# Patient Record
Sex: FEMALE | Race: WHITE | NOT HISPANIC OR LATINO | Employment: UNEMPLOYED | URBAN - METROPOLITAN AREA
[De-identification: names, ages, dates, MRNs, and addresses within clinical notes are randomized per-mention and may not be internally consistent; named-entity substitution may affect disease eponyms.]

---

## 2017-06-29 ENCOUNTER — TELEPHONE (OUTPATIENT)
Dept: OBGYN | Facility: CLINIC | Age: 27
End: 2017-06-29

## 2017-06-29 ENCOUNTER — PRENATAL OFFICE VISIT (OUTPATIENT)
Dept: OBGYN | Facility: CLINIC | Age: 27
End: 2017-06-29
Payer: COMMERCIAL

## 2017-06-29 VITALS
DIASTOLIC BLOOD PRESSURE: 78 MMHG | SYSTOLIC BLOOD PRESSURE: 121 MMHG | WEIGHT: 181.8 LBS | HEART RATE: 80 BPM | HEIGHT: 61 IN | BODY MASS INDEX: 34.32 KG/M2

## 2017-06-29 DIAGNOSIS — Z34.01 ENCOUNTER FOR SUPERVISION OF NORMAL FIRST PREGNANCY IN FIRST TRIMESTER: ICD-10-CM

## 2017-06-29 DIAGNOSIS — O02.1 MISSED ABORTION: Primary | ICD-10-CM

## 2017-06-29 LAB
ABO + RH BLD: NORMAL
ABO + RH BLD: NORMAL
ALBUMIN UR-MCNC: NEGATIVE MG/DL
APPEARANCE UR: CLEAR
B-HCG SERPL-ACNC: 8976 IU/L (ref 0–5)
BILIRUB UR QL STRIP: NEGATIVE
COLOR UR AUTO: YELLOW
GLUCOSE UR STRIP-MCNC: NEGATIVE MG/DL
HGB UR QL STRIP: ABNORMAL
KETONES UR STRIP-MCNC: NEGATIVE MG/DL
LEUKOCYTE ESTERASE UR QL STRIP: NEGATIVE
NITRATE UR QL: NEGATIVE
NON-SQ EPI CELLS #/AREA URNS LPF: ABNORMAL /LPF
PH UR STRIP: 5.5 PH (ref 5–7)
RBC #/AREA URNS AUTO: ABNORMAL /HPF (ref 0–2)
SP GR UR STRIP: <=1.005 (ref 1–1.03)
SPECIMEN EXP DATE BLD: NORMAL
URN SPEC COLLECT METH UR: ABNORMAL
UROBILINOGEN UR STRIP-ACNC: 0.2 EU/DL (ref 0.2–1)
WBC #/AREA URNS AUTO: ABNORMAL /HPF (ref 0–2)

## 2017-06-29 PROCEDURE — 87086 URINE CULTURE/COLONY COUNT: CPT | Performed by: OBSTETRICS & GYNECOLOGY

## 2017-06-29 PROCEDURE — 86901 BLOOD TYPING SEROLOGIC RH(D): CPT | Performed by: OBSTETRICS & GYNECOLOGY

## 2017-06-29 PROCEDURE — 84702 CHORIONIC GONADOTROPIN TEST: CPT | Performed by: OBSTETRICS & GYNECOLOGY

## 2017-06-29 PROCEDURE — 36415 COLL VENOUS BLD VENIPUNCTURE: CPT | Performed by: OBSTETRICS & GYNECOLOGY

## 2017-06-29 PROCEDURE — 86900 BLOOD TYPING SEROLOGIC ABO: CPT | Performed by: OBSTETRICS & GYNECOLOGY

## 2017-06-29 PROCEDURE — 99214 OFFICE O/P EST MOD 30 MIN: CPT | Performed by: OBSTETRICS & GYNECOLOGY

## 2017-06-29 PROCEDURE — 99207 ZZC FIRST OB VISIT: CPT | Performed by: OBSTETRICS & GYNECOLOGY

## 2017-06-29 PROCEDURE — 81001 URINALYSIS AUTO W/SCOPE: CPT | Performed by: OBSTETRICS & GYNECOLOGY

## 2017-06-29 ASSESSMENT — ANXIETY QUESTIONNAIRES
IF YOU CHECKED OFF ANY PROBLEMS ON THIS QUESTIONNAIRE, HOW DIFFICULT HAVE THESE PROBLEMS MADE IT FOR YOU TO DO YOUR WORK, TAKE CARE OF THINGS AT HOME, OR GET ALONG WITH OTHER PEOPLE: NOT DIFFICULT AT ALL
6. BECOMING EASILY ANNOYED OR IRRITABLE: NOT AT ALL
2. NOT BEING ABLE TO STOP OR CONTROL WORRYING: NOT AT ALL
7. FEELING AFRAID AS IF SOMETHING AWFUL MIGHT HAPPEN: NOT AT ALL
GAD7 TOTAL SCORE: 0
3. WORRYING TOO MUCH ABOUT DIFFERENT THINGS: NOT AT ALL
5. BEING SO RESTLESS THAT IT IS HARD TO SIT STILL: NOT AT ALL
1. FEELING NERVOUS, ANXIOUS, OR ON EDGE: NOT AT ALL

## 2017-06-29 ASSESSMENT — PATIENT HEALTH QUESTIONNAIRE - PHQ9: 5. POOR APPETITE OR OVEREATING: NOT AT ALL

## 2017-06-29 NOTE — TELEPHONE ENCOUNTER
Reason for Call:  Request for results:    Name of test or procedure: patient saw MI for her 1st pre-millie visit. She wants to talk about the lab resuts    Date of test of procedure: today    Location of the test or procedure: lab    OK to leave the result message on voice mail or with a family member? YES    Phone number Patient can be reached at:  Cell number on file:    Telephone Information:   Mobile [phone]       Additional comments: none    Call taken on 2017 at 3:35 PM by Norma Singletary

## 2017-06-29 NOTE — PROGRESS NOTES
This is a 26 year old female patient,   who presents for her first obstetrical visit.    Patient's last menstrual period was 2017..  This gives her an EDC of 2018 .  She is 8w2d weeks.  Her cycles are regular.  Her last menstrual period was normal.  She has not had an ultrasound prior to the visit..  Since her LMP, she has experienced  abdominal pain and fatigue).  She denies nausea, emesis, headache, loss of appetite, vaginal discharge, dysuria, pelvic pain, urinary urgency, lightheadedness, urinary frequency, vaginal bleeding, hemorrhoids and constipation.    No bleeding or problems. She is sure of her dates.       Past History:  Her past medical history   Past Medical History:   Diagnosis Date     Cold sore      Need for prophylactic vaccination and inoculation against other viral diseases(V04.89) (aka GARDASIL)     Completed series in      OCP (oral contraceptive pills) initiation    .      This is her first pregnancy    Since her last LMP she denies use of alcohol, tobacco and street drugs.    HISTORY:  Obstetric History       T0      L0     SAB0   TAB0   Ectopic0   Multiple0   Live Births0       # Outcome Date GA Lbr Damián/2nd Weight Sex Delivery Anes PTL Lv   1 Current                 Past Medical History:   Diagnosis Date     Cold sore      Need for prophylactic vaccination and inoculation against other viral diseases(V04.89) (aka GARDASIL)     Completed series in      OCP (oral contraceptive pills) initiation      Past Surgical History:   Procedure Laterality Date     C REPAIR CRUCIATE LIGAMENT,KNEE       HC CREATE EARDRUM OPENING,GEN ANESTH      P.E. Tubes     HC REMOVAL ADENOIDS,PRIMARY,<13 Y/O  2001     LUMPECTOMY BREAST Left      Family History   Problem Relation Age of Onset     Breast Cancer Maternal Grandmother      DIABETES Maternal Grandfather      HEART DISEASE Maternal Grandfather 57     triple bypass     Hyperlipidemia Maternal Grandfather       "Hypertension Maternal Grandfather      Social History     Social History     Marital status: Single     Spouse name: N/A     Number of children: 0     Years of education: N/A     Occupational History      Student     Social History Main Topics     Smoking status: Never Smoker     Smokeless tobacco: Never Used      Comment: No smokers in home.     Alcohol use No     Drug use: No     Sexual activity: Yes     Partners: Male     Birth control/ protection: OCP      Comment: LMP 09/12/15     Other Topics Concern     None     Social History Narrative    No advanced care directives on file    Student    Single         Current Outpatient Prescriptions   Medication Sig     norethindrone-ethinyl estradiol (JUNEL 1.5/30) 1.5-30 MG-MCG per tablet TAKE 1 TABLET BY MOUTH EVERYDAY CONTINUOSLY. (Patient not taking: Reported on 6/29/2017)     valACYclovir (VALTREX) 1000 mg tablet Take 1 tablet (1,000 mg) by mouth daily (Patient not taking: Reported on 6/29/2017)     TRAMADOL HCL 50 MG OR TABS ONE TO TWO TABLETS EVERY 4 TO 6 HOURS AS NEEDED FOR PAIN (Patient not taking: No sig reported)     No current facility-administered medications for this visit.      Allergies   Allergen Reactions     No Known Drug Allergies        Past medical, surgical, social and family history were reviewed and updated in [facility].    ROS:   Constitutional: Fatigue  Gastrointestinal: Abdominal Pain    EXAM:  /78 (BP Location: Right arm, Patient Position: Sitting, Cuff Size: Adult Regular)  Pulse 80  Ht 5' 1\" (1.549 m)  Wt 181 lb 12.8 oz (82.5 kg)  LMP 05/02/2017  BMI 34.35 kg/m2   BMI: Body mass index is 34.35 kg/(m^2).    EXAM:  Constitutional:  Appearance: Well nourished, well developed alert, in no acute distress.  Neck:   Lymph Nodes:  No lymphadenopathy present.    Thyroid:  Gland size normal, nontender, no nodules or masses present  on palpation.  Chest:  Respiratory Effort:  Breathing unlabored.  Cardiovascular:  Heart Auscultation:  Regular " rate, normal rhythm, no murmurs    present.  Breasts: Deferred.    Axillary Lymph Nodes:  No lymphadenopathy present.  Gastrointestinal:  Abdominal Examination:  Abdomen nontender to palpation, tone  normal without rigidity or guarding, no masses present, umbilicus without  Lesions.    Liver and speen:  No hepatomegaly present, liver nontender to  palpation.    Hernias:  No hernias present.  Lymphatic: Lymph Nodes:  No other lymphadenopathy present.  Skin:  General Inspection:  No rashes present, no lesions present, no areas of  discoloration.    Genitalia and Groin:  No rashes present, no lesions present, no areas of  discoloration, no masses present.  Neurologic/Psychiatric:    Mental Status:  Oriented X3.    Pelvic Exam:  External Genitalia:     Normal appearance for age, no discharge present, no tenderness present, no inflammatory lesions present, color normal  Vagina:     Normal vaginal vault without central or paravaginal defects, no discharge present, no inflammatory lesions present, no masses present  Bladder:     Nontender to palpation  Urethra:   Urethral Body:  Urethra palpation normal, urethra structural support normal   Urethral Meatus:  No erythema or lesions present  Cervix:     Appearance healthy, no lesions present, nontender to palpation, no bleeding present  Uterus:     Uterus: firm, normal sized and nontender, retroverted in position.   Adnexa:     No adnexal tenderness present, no adnexal masses present  Perineum:     Perineum within normal limits, no evidence of trauma, no rashes or skin lesions present  Anus:     Anus within normal limits, no hemorrhoids present  Inguinal Lymph Nodes:     No lymphadenopathy present  Pubic Hair:     Normal pubic hair distribution for age  Genitalia and Groin:     No rashes present, no lesions present, no areas of discoloration, no masses present    Bedside u/s: Intrauterine gestational sac with yolk sac. No fetal pole    ASSESSMENT:      ICD-10-CM    1. Missed   O02.1 HCG quantitative pregnancy     HCG quantitative pregnancy     ABO and Rh   2. Encounter for supervision of normal first pregnancy in first trimester Z34.01 Urine Culture Aerobic Bacterial     UA with Microscopic       PLAN/PATIENT INSTRUCTIONS:    Since pt is sure of LMP, Missed AB is likely. Will have HCG today and in 48 hours. Discussed reasons for AB and rates. Discussed observation, medication or D/C. Discussed chance for recurrence and early u/s.      Rafael Carrasco MD

## 2017-06-29 NOTE — MR AVS SNAPSHOT
"              After Visit Summary   2017    Guerita SILVA    MRN: [id number]           Patient Information     Date Of Birth          1990        Visit Information        Provider Department      2017 9:00 AM Rafael Carrasco MD; WE TRIAGE [facility]        Today's Diagnoses     Missed     -  1    Encounter for supervision of normal first pregnancy in first trimester           Follow-ups after your visit        Your next 10 appointments already scheduled     2017  4:30 PM CDT   Classes with WE CLASS   [facility] ([facility] [place])    [address] [place] [id number]   [phone]              Future tests that were ordered for you today     Open Future Orders        Priority Expected Expires Ordered    HCG quantitative pregnancy STAT 2017            Who to contact     If you have questions or need follow up information about today's clinic visit or your schedule please contact [facility] directly at [phone].  Normal or non-critical lab and imaging results will be communicated to you by Eathart, letter or phone within 4 business days after the clinic has received the results. If you do not hear from us within 7 days, please contact the clinic through Chirpmet or phone. If you have a critical or abnormal lab result, we will notify you by phone as soon as possible.  Submit refill requests through [employer] or call your pharmacy and they will forward the refill request to us. Please allow 3 business days for your refill to be completed.          Additional Information About Your Visit        Eathart Information     [employer] lets you send messages to your doctor, view your test results, renew your prescriptions, schedule appointments and more. To sign up, go to [email or web address] . Click on \"Log in\" on the left side of the screen, which will take you to " "the Welcome page. Then click on \"Sign up Now\" on the right side of the page.     You will be asked to enter the access code listed below, as well as some personal information. Please follow the directions to create your username and password.     Your access code is: [email or web address]  Expires: 2017 10:01 AM     Your access code will  in 90 days. If you need help or a new code, please call your [facility] clinic or [phone].        Care EveryWhere ID     This is your Care EveryWhere ID. This could be used by other organizations to access your [facility] medical records  [id number]        Your Vitals Were     Pulse Height Last Period BMI (Body Mass Index)          80 5' 1\" (1.549 m) 2017 34.35 kg/m2         Blood Pressure from Last 3 Encounters:   17 121/78   10/20/16 126/84   09/18/15 112/76    Weight from Last 3 Encounters:   17 181 lb 12.8 oz (82.5 kg)   10/20/16 180 lb 12.8 oz (82 kg)   09/18/15 168 lb (76.2 kg)              We Performed the Following     ABO and Rh     HCG quantitative pregnancy     UA with Microscopic     Urine Culture Aerobic Bacterial        Primary Care Provider Office Phone # Fax #    Marcio Alan Harper [phone]       [facility] [address] [facility] [address]        Equal Access to Services     [facility] AH: Hadii aad ku hadasho Sochelita, waaxda luqadaha, qaybta kaalmada adethuyada, alina hylton . So [facility] [phone].    ATENCIÓN: Si habla español, tiene a tang disposición servicios gratuitos de asistencia lingüística. Llame al [phone].    We comply with applicable federal civil rights laws and [place] laws. We do not discriminate on the basis of race, color, national origin, age, disability sex, sexual orientation or gender identity.            Thank you!     Thank you for choosing [facility]  for your care. Our goal is always to provide you with excellent care. " Hearing back from our patients is one way we can continue to improve our services. Please take a few minutes to complete the written survey that you may receive in the mail after your visit with us. Thank you!             Your Updated Medication List - Protect others around you: Learn how to safely use, store and throw away your medicines at www.disposemymeds.org.          This list is accurate as of: 6/29/17 10:02 AM.  Always use your most recent med list.                   Brand Name Dispense Instructions for use Diagnosis    norethindrone-ethinyl estradiol 1.5-30 MG-MCG per tablet    JUNEL 1.5/30    112 tablet    TAKE 1 TABLET BY MOUTH EVERYDAY CONTINUOSLY.    Uses oral contraception       traMADol 50 MG tablet    ULTRAM    28    ONE TO TWO TABLETS EVERY 4 TO 6 HOURS AS NEEDED FOR PAIN    Migraine headaches       valACYclovir 1000 mg tablet    VALTREX    90 tablet    Take 1 tablet (1,000 mg) by mouth daily    Recurrent cold sores

## 2017-06-29 NOTE — NURSING NOTE
Obstetrical Risk History    Patient presents for new OB labs and teaching.      1. Please indicate any condition you have or have had in the past:  Cold Sores  2. Do you smoke?  No  If yes, how many packs/day?   3. Do you drink alcoholic beverages? No  If yes, how often?  What type?   4. List any medications taken since your last period: None  5. List any recreational drugs (cocaine, marijuana, etc. used since your last period:None    6. List any chemical or radiation exposure that you've encountered: None  7. Are you on a restricted diet? No  If yes, please describe:  Do you have any Spiritism objections to any form of treatment? No    GENETIC SCREENING    These questions apply to you, the baby's father, or anyone in either family with:    1. Patient's age 35 or greater at delivery? No  2. Chadian, Puerto Rican, Mediterranean ancestry? No  3. Neural Tube Defect (Meningomyelocele, Spina Bifida, or Anencephaly)? No  4. Voodoo, Kyr[place]z [place] or history of Berlin-Sachs disease? No  5. Down's Syndrome?   No  6. Hemophilia or clotting disorder? No  7. Muscular Dystrophy? No  8. Cystic Fibrosis? No  9. [place]'s Chorea? No  10. Mental Retardation? No  11. 3 or more miscarriages or a stillborn? No  12. Other inherited disease or chromosomal disorder? No  13. Have you or the baby's father had a child born with a birth defect? No  14. Did you or the baby's father have a birth defect yourselves? No    Do you have any other concerns about birth defects? No    Last Pap 9/18/15- normal  Has not had flu shot. History of cold sores takes valACYclovir (VALTREX) 1000 mg tablet

## 2017-06-30 LAB
BACTERIA SPEC CULT: NO GROWTH
Lab: NORMAL
MICRO REPORT STATUS: NORMAL
SPECIMEN SOURCE: NORMAL

## 2017-06-30 ASSESSMENT — ANXIETY QUESTIONNAIRES: GAD7 TOTAL SCORE: 0

## 2017-06-30 ASSESSMENT — PATIENT HEALTH QUESTIONNAIRE - PHQ9: SUM OF ALL RESPONSES TO PHQ QUESTIONS 1-9: 0

## 2017-06-30 NOTE — TELEPHONE ENCOUNTER
Pt called back and was just on the phone with [facility] lab where she is going to go tomorrow (7/1/17) to get her HCG quant lab drawn. Pt is going to go around 10:30 AM. That was on the questions that had. Pt also had the question that her HCG value was so high, is that ok? Informed that the high value shows how far along she was and Dr. Carrasco is aware of her number. The main thing is that we want to make sure it's dropping appropriately, not too slow, and also not rising.   Pt verbalized understanding of information. Lab order has already been ordered/futured out by Dr. Carrasco.      Closing encounter.

## 2017-07-01 ENCOUNTER — HOSPITAL ENCOUNTER (OUTPATIENT)
Dept: LAB | Facility: CLINIC | Age: 27
Discharge: HOME | End: 2017-07-01
Attending: OBSTETRICS & GYNECOLOGY | Admitting: OBSTETRICS & GYNECOLOGY
Payer: COMMERCIAL

## 2017-07-01 DIAGNOSIS — O02.1 MISSED ABORTION: ICD-10-CM

## 2017-07-01 LAB — B-HCG SERPL-ACNC: ABNORMAL IU/L (ref 0–5)

## 2017-07-01 PROCEDURE — 36415 COLL VENOUS BLD VENIPUNCTURE: CPT | Performed by: OBSTETRICS & GYNECOLOGY

## 2017-07-01 PROCEDURE — 84702 CHORIONIC GONADOTROPIN TEST: CPT | Performed by: OBSTETRICS & GYNECOLOGY

## 2017-07-01 NOTE — TELEPHONE ENCOUNTER
HCG from Saturday is 12,234. This is less than 60% rise.   To be sure of diagnosis I will have Pt RTC Monday for another HCG. If that is still increasing would repeat u/s Wed AM before D/C to be sure of diagnosis.  Left message on pt's phone.

## 2017-07-03 ENCOUNTER — TELEPHONE (OUTPATIENT)
Dept: NURSING | Facility: CLINIC | Age: 27
End: 2017-07-03

## 2017-07-03 DIAGNOSIS — O36.80X0 ENCOUNTER TO DETERMINE FETAL VIABILITY OF PREGNANCY, NOT APPLICABLE OR UNSPECIFIED FETUS: Primary | ICD-10-CM

## 2017-07-03 DIAGNOSIS — O02.1 MISSED ABORTION: ICD-10-CM

## 2017-07-03 LAB — B-HCG SERPL-ACNC: ABNORMAL IU/L (ref 0–5)

## 2017-07-03 PROCEDURE — 84702 CHORIONIC GONADOTROPIN TEST: CPT | Performed by: OBSTETRICS & GYNECOLOGY

## 2017-07-03 PROCEDURE — 36415 COLL VENOUS BLD VENIPUNCTURE: CPT | Performed by: OBSTETRICS & GYNECOLOGY

## 2017-07-03 NOTE — TELEPHONE ENCOUNTER
Pt calling about her HCG results that were drawn today: [phone]  Informed of what Dr. Carrasco noted below about next step. Pt verbalized understanding of information and transferred to scheduling to make U/S appt and appt with provider after.  Pt denied any cramping or vaginal bleeding. U/S order placed/futured out.

## 2017-07-07 ENCOUNTER — RADIANT APPOINTMENT (OUTPATIENT)
Dept: ULTRASOUND IMAGING | Facility: CLINIC | Age: 27
End: 2017-07-07
Attending: OBSTETRICS & GYNECOLOGY
Payer: COMMERCIAL

## 2017-07-07 ENCOUNTER — OFFICE VISIT (OUTPATIENT)
Dept: OBGYN | Facility: CLINIC | Age: 27
End: 2017-07-07
Attending: OBSTETRICS & GYNECOLOGY
Payer: COMMERCIAL

## 2017-07-07 VITALS — DIASTOLIC BLOOD PRESSURE: 72 MMHG | WEIGHT: 180 LBS | SYSTOLIC BLOOD PRESSURE: 112 MMHG | BODY MASS INDEX: 34.01 KG/M2

## 2017-07-07 DIAGNOSIS — Z34.91 ENCOUNTER FOR SUPERVISION OF LOW-RISK PREGNANCY IN FIRST TRIMESTER: Primary | ICD-10-CM

## 2017-07-07 DIAGNOSIS — O36.80X0 ENCOUNTER TO DETERMINE FETAL VIABILITY OF PREGNANCY, NOT APPLICABLE OR UNSPECIFIED FETUS: ICD-10-CM

## 2017-07-07 LAB
ABO + RH BLD: NORMAL
ABO + RH BLD: NORMAL
BASOPHILS # BLD AUTO: 0 10E9/L (ref 0–0.2)
BASOPHILS NFR BLD AUTO: 0.3 %
BLD GP AB SCN SERPL QL: NORMAL
BLOOD BANK CMNT PATIENT-IMP: NORMAL
DIFFERENTIAL METHOD BLD: ABNORMAL
EOSINOPHIL # BLD AUTO: 0.1 10E9/L (ref 0–0.7)
EOSINOPHIL NFR BLD AUTO: 0.5 %
ERYTHROCYTE [DISTWIDTH] IN BLOOD BY AUTOMATED COUNT: 12.5 % (ref 10–15)
HBV SURFACE AG SERPL QL IA: NONREACTIVE
HCT VFR BLD AUTO: 39.4 % (ref 35–47)
HGB BLD-MCNC: 13 G/DL (ref 11.7–15.7)
HIV 1+2 AB+HIV1 P24 AG SERPL QL IA: NORMAL
LYMPHOCYTES # BLD AUTO: 1.9 10E9/L (ref 0.8–5.3)
LYMPHOCYTES NFR BLD AUTO: 16.8 %
MCH RBC QN AUTO: 29.9 PG (ref 26.5–33)
MCHC RBC AUTO-ENTMCNC: 33 G/DL (ref 31.5–36.5)
MCV RBC AUTO: 91 FL (ref 78–100)
MONOCYTES # BLD AUTO: 0.6 10E9/L (ref 0–1.3)
MONOCYTES NFR BLD AUTO: 5 %
NEUTROPHILS # BLD AUTO: 8.9 10E9/L (ref 1.6–8.3)
NEUTROPHILS NFR BLD AUTO: 77.4 %
PLATELET # BLD AUTO: 227 10E9/L (ref 150–450)
RBC # BLD AUTO: 4.35 10E12/L (ref 3.8–5.2)
SPECIMEN EXP DATE BLD: NORMAL
WBC # BLD AUTO: 11.5 10E9/L (ref 4–11)

## 2017-07-07 PROCEDURE — 76817 TRANSVAGINAL US OBSTETRIC: CPT | Performed by: OBSTETRICS & GYNECOLOGY

## 2017-07-07 PROCEDURE — 86850 RBC ANTIBODY SCREEN: CPT | Performed by: OBSTETRICS & GYNECOLOGY

## 2017-07-07 PROCEDURE — 87389 HIV-1 AG W/HIV-1&-2 AB AG IA: CPT | Performed by: OBSTETRICS & GYNECOLOGY

## 2017-07-07 PROCEDURE — 99207 ZZC PRENATAL VISIT: CPT | Performed by: OBSTETRICS & GYNECOLOGY

## 2017-07-07 PROCEDURE — 86762 RUBELLA ANTIBODY: CPT | Performed by: OBSTETRICS & GYNECOLOGY

## 2017-07-07 PROCEDURE — 85025 COMPLETE CBC W/AUTO DIFF WBC: CPT | Performed by: OBSTETRICS & GYNECOLOGY

## 2017-07-07 PROCEDURE — 86780 TREPONEMA PALLIDUM: CPT | Performed by: OBSTETRICS & GYNECOLOGY

## 2017-07-07 PROCEDURE — 87340 HEPATITIS B SURFACE AG IA: CPT | Performed by: OBSTETRICS & GYNECOLOGY

## 2017-07-07 PROCEDURE — 36415 COLL VENOUS BLD VENIPUNCTURE: CPT | Performed by: OBSTETRICS & GYNECOLOGY

## 2017-07-07 PROCEDURE — 86900 BLOOD TYPING SEROLOGIC ABO: CPT | Performed by: OBSTETRICS & GYNECOLOGY

## 2017-07-07 PROCEDURE — 86901 BLOOD TYPING SEROLOGIC RH(D): CPT | Performed by: OBSTETRICS & GYNECOLOGY

## 2017-07-07 NOTE — MR AVS SNAPSHOT
After Visit Summary   7/7/2017    Guerita SILVA    MRN: [id number]           Patient Information     Date Of Birth          1990        Visit Information        Provider Department      7/7/2017 8:40 AM Rafael Carrasco MD [facility]        Today's Diagnoses     Encounter for supervision of low-risk pregnancy in first trimester    -  1       Follow-ups after your visit        Who to contact     If you have questions or need follow up information about today's clinic visit or your schedule please contact [facility] directly at [phone].  Normal or non-critical lab and imaging results will be communicated to you by S[employer], letter or phone within 4 business days after the clinic has received the results. If you do not hear from us within 7 days, please contact the clinic through S[employer] or phone. If you have a critical or abnormal lab result, we will notify you by phone as soon as possible.  Submit refill requests through Podotree or call your pharmacy and they will forward the refill request to us. Please allow 3 business days for your refill to be completed.          Additional Information About Your Visit        MyChart Information     Podotree gives you secure access to your electronic health record. If you see a primary care provider, you can also send messages to your care team and make appointments. If you have questions, please call your primary care clinic.  If you do not have a primary care provider, please call [phone] and they will assist you.        Care EveryWhere ID     This is your Care EveryWhere ID. This could be used by other organizations to access your [facility] medical records  [id number]        Your Vitals Were     Last Period BMI (Body Mass Index)                05/02/2017 34.01 kg/m2           Blood Pressure from Last 3 Encounters:   07/07/17 112/72   06/29/17 121/78   10/20/16 126/84    Weight from Last 3 Encounters:    07/07/17 180 lb (81.6 kg)   06/29/17 181 lb 12.8 oz (82.5 kg)   10/20/16 180 lb 12.8 oz (82 kg)              We Performed the Following     ABO/Rh type and screen     Anti Treponema     CBC with platelets differential     Hepatitis B surface antigen     HIV Antigen Antibody Combo     Rubella Antibody IgG Quantitative          Today's Medication Changes          These changes are accurate as of: 7/7/17  9:31 AM.  If you have any questions, ask your nurse or doctor.               Stop taking these medicines if you haven't already. Please contact your care team if you have questions.     norethindrone-ethinyl estradiol 1.5-30 MG-MCG per tablet   Commonly known as:  JUNEL 1.5/30   Stopped by:  Rafael Carrasco MD           traMADol 50 MG tablet   Commonly known as:  ULTRAM   Stopped by:  Rafael Carrasco MD           valACYclovir 1000 mg tablet   Commonly known as:  VALTREX   Stopped by:  Rafael Carrasco MD                    Primary Care Provider Office Phone # Fax #    Marcio Alan Harper [phone]       [facility] [address] [facility] [address]        Equal Access to Services     [facility] AH: Hadii aad ku hadasho Soomaali, waaxda luqadaha, qaybta kaalmada adeegyada, waxay idiin hayaan adeeg kharash lacriston ah. So [facility] [phone].    ATENCIÓN: Si habla español, tiene a tang disposición servicios gratuitos de asistencia lingüística. Shanikaame al [phone].    We comply with applicable federal civil rights laws and [place] laws. We do not discriminate on the basis of race, color, national origin, age, disability sex, sexual orientation or gender identity.            Thank you!     Thank you for choosing [facility]  for your care. Our goal is always to provide you with excellent care. Hearing back from our patients is one way we can continue to improve our services. Please take a few minutes to complete the written survey that you may  receive in the mail after your visit with us. Thank you!             Your Updated Medication List - Protect others around you: Learn how to safely use, store and throw away your medicines at www.disposemymeds.org.      Notice  As of 7/7/2017  9:31 AM    You have not been prescribed any medications.

## 2017-07-07 NOTE — PROGRESS NOTES
BHCGs were rising and ultrasound repeated today. Now with 6 week single IUP with normal heart rate.   No bleeding.   Dates are changed. EDC now 2/26/18  RTC in 4 weeks for FHT and Innatal.

## 2017-07-08 LAB — T PALLIDUM IGG+IGM SER QL: NEGATIVE

## 2017-07-12 LAB — RUBV IGG SERPL IA-ACNC: 11 IU/ML

## 2017-08-04 ENCOUNTER — PRENATAL OFFICE VISIT (OUTPATIENT)
Dept: OBGYN | Facility: CLINIC | Age: 27
End: 2017-08-04
Payer: COMMERCIAL

## 2017-08-04 VITALS — DIASTOLIC BLOOD PRESSURE: 82 MMHG | WEIGHT: 181 LBS | BODY MASS INDEX: 34.2 KG/M2 | SYSTOLIC BLOOD PRESSURE: 120 MMHG

## 2017-08-04 DIAGNOSIS — Z34.01 ENCOUNTER FOR SUPERVISION OF NORMAL FIRST PREGNANCY IN FIRST TRIMESTER: ICD-10-CM

## 2017-08-04 PROCEDURE — 99207 ZZC PRENATAL VISIT: CPT | Performed by: OBSTETRICS & GYNECOLOGY

## 2017-08-04 NOTE — PROGRESS NOTES
Feels good. Some nausea. No bleeding.  Reassured with normal bedside u/s. Baby moving.  RTC 4+ weeks for AFP.  Still deciding about Innatal.

## 2017-08-04 NOTE — NURSING NOTE
Pt is unsure of Innatal testing, Lab cancelled test until pt decides. She will make an appt in the next week if she would like to have it done

## 2017-08-04 NOTE — MR AVS SNAPSHOT
After Visit Summary   8/4/2017    Guerita SILVA    MRN: [id number]           Patient Information     Date Of Birth          1990        Visit Information        Provider Department      8/4/2017 8:00 AM Rafael Carrasco MD; WE TRIAGE [facility]        Today's Diagnoses     Encounter for supervision of normal first pregnancy in first trimester           Follow-ups after your visit        Your next 10 appointments already scheduled     Sep 05, 2017  3:00 PM CDT   ESTABLISHED PRENATAL with Rafael Carrasco MD   [facility] ([facility]    [address] [place] [id number] [phone]              Who to contact     If you have questions or need follow up information about today's clinic visit or your schedule please contact [facility] directly at [phone].  Normal or non-critical lab and imaging results will be communicated to you by MyChart, letter or phone within 4 business days after the clinic has received the results. If you do not hear from us within 7 days, please contact the clinic through H[employer]hart or phone. If you have a critical or abnormal lab result, we will notify you by phone as soon as possible.  Submit refill requests through [employer] or call your pharmacy and they will forward the refill request to us. Please allow 3 business days for your refill to be completed.          Additional Information About Your Visit        MyChart Information     [employer] gives you secure access to your electronic health record. If you see a primary care provider, you can also send messages to your care team and make appointments. If you have questions, please call your primary care clinic.  If you do not have a primary care provider, please call [phone] and they will assist you.        Care EveryWhere ID     This is your Care EveryWhere ID. This could be used by other organizations  to access your [facility] medical records  [id number]        Your Vitals Were     Last Period Breastfeeding? BMI (Body Mass Index)             05/02/2017 No 34.2 kg/m2          Blood Pressure from Last 3 Encounters:   08/04/17 120/82   07/07/17 112/72   06/29/17 121/78    Weight from Last 3 Encounters:   08/04/17 181 lb (82.1 kg)   07/07/17 180 lb (81.6 kg)   06/29/17 181 lb 12.8 oz (82.5 kg)              We Performed the Following     Non Invasive Prenatal Test Cell Free DNA        Primary Care Provider Office Phone # Fax #    Marcio Alan Harper [phone]       [facility] [address] [facility] [address]        Equal Access to Services     JUANITO PEDRAZA : Hadii aad ku hadasho Sochelita, waaxda luqadaha, qaybta kaalmada adeegyada, alina hylton . So [facility] [phone].    ATENCIÓN: Si habla español, tiene a tang disposición servicios gratuitos de asistencia lingüística. Llame al [phone].    We comply with applicable federal civil rights laws and [place] laws. We do not discriminate on the basis of race, color, national origin, age, disability sex, sexual orientation or gender identity.            Thank you!     Thank you for choosing [facility]  for your care. Our goal is always to provide you with excellent care. Hearing back from our patients is one way we can continue to improve our services. Please take a few minutes to complete the written survey that you may receive in the mail after your visit with us. Thank you!             Your Updated Medication List - Protect others around you: Learn how to safely use, store and throw away your medicines at www.disposemymeds.org.      Notice  As of 8/4/2017  9:04 AM    You have not been prescribed any medications.

## 2017-08-08 ENCOUNTER — TELEPHONE (OUTPATIENT)
Dept: NURSING | Facility: CLINIC | Age: 27
End: 2017-08-08

## 2017-08-08 NOTE — TELEPHONE ENCOUNTER
Pt calling in and says she has a cold sore started on her lip yesterday. She is wondering if she can take her Valtrex 1000mg  Tabs -during her pregnancy (11w1d)?  Informed the patient that the Valtrex is approved by our providers to take during pregnancy. Pt voices understanding.

## 2017-09-05 ENCOUNTER — PRENATAL OFFICE VISIT (OUTPATIENT)
Dept: OBGYN | Facility: CLINIC | Age: 27
End: 2017-09-05
Payer: COMMERCIAL

## 2017-09-05 VITALS — DIASTOLIC BLOOD PRESSURE: 64 MMHG | SYSTOLIC BLOOD PRESSURE: 122 MMHG | WEIGHT: 180 LBS | BODY MASS INDEX: 34.01 KG/M2

## 2017-09-05 DIAGNOSIS — Z36.1 NEED FOR MATERNAL SERUM ALPHA-PROTEIN (MSAFP) SCREENING: Primary | ICD-10-CM

## 2017-09-05 DIAGNOSIS — Z23 NEED FOR PROPHYLACTIC VACCINATION AND INOCULATION AGAINST INFLUENZA: ICD-10-CM

## 2017-09-05 DIAGNOSIS — Z34.02 ENCOUNTER FOR SUPERVISION OF NORMAL FIRST PREGNANCY IN SECOND TRIMESTER: ICD-10-CM

## 2017-09-05 PROCEDURE — 99000 SPECIMEN HANDLING OFFICE-LAB: CPT | Performed by: OBSTETRICS & GYNECOLOGY

## 2017-09-05 PROCEDURE — 90471 IMMUNIZATION ADMIN: CPT | Performed by: OBSTETRICS & GYNECOLOGY

## 2017-09-05 PROCEDURE — 99207 ZZC PRENATAL VISIT: CPT | Performed by: OBSTETRICS & GYNECOLOGY

## 2017-09-05 PROCEDURE — 82105 ALPHA-FETOPROTEIN SERUM: CPT | Mod: 90 | Performed by: OBSTETRICS & GYNECOLOGY

## 2017-09-05 PROCEDURE — 36415 COLL VENOUS BLD VENIPUNCTURE: CPT | Performed by: OBSTETRICS & GYNECOLOGY

## 2017-09-05 PROCEDURE — 90686 IIV4 VACC NO PRSV 0.5 ML IM: CPT | Performed by: OBSTETRICS & GYNECOLOGY

## 2017-09-05 NOTE — PROGRESS NOTES
Feels good. Watching diet. Eating when hungry but eating smart.  Plans AFP today  RTC 4 weeks for u/s  Flu Shot today

## 2017-09-05 NOTE — PROGRESS NOTES
Injectable Influenza Immunization Documentation    1.  Is the person to be vaccinated sick today?  No    2. Does the person to be vaccinated have an allergy to eggs or to a component of the vaccine?  No    3. Has the person to be vaccinated today ever had a serious reaction to influenza vaccine in the past?  NA, 1st one    4. Has the person to be vaccinated ever had Guillain-[place] syndrome?  No     Form completed by Corinne Wong [facility]

## 2017-09-05 NOTE — MR AVS SNAPSHOT
After Visit Summary   9/5/2017    Guerita SILVA    MRN: [id number]           Patient Information     Date Of Birth          1990        Visit Information        Provider Department      9/5/2017 3:00 PM Rafael Carrasco MD [facility]        Today's Diagnoses     Need for maternal serum alpha-protein (MSAFP) screening    -  1    Encounter for supervision of normal first pregnancy in second trimester           Follow-ups after your visit        Your next 10 appointments already scheduled     Oct 12, 2017  2:15 PM CDT   US PELVIC COMPLETE W TRANSVAGINAL with WEUS1   [facility] ([facility])    [address]  [facility] [address] [phone]           Please bring a list of your medicines (including vitamins, minerals and over-the-counter drugs). Also, tell your doctor about any allergies you may have. Wear comfortable clothes and leave your valuables at home.  Adults: Drink six 8-ounce glasses of fluid one hour before your exam. Do NOT empty your bladder.  If you need to empty your bladder before your exam, try to release only a little bit of urine. Then, drink another 8oz glass of fluid.  Children: Children who are potty trained should drink at least 4 cups (32 oz) of liquid 45 minutes to one hour prior to the exam. The child s bladder must be full in order to achieve a diagnostic exam. If your child is very uncomfortable or has an urgent need to pee, please notify a technologist; they will try to find out how much longer the wait may be and provide instructions to help relieve the pressure. Occasionally it is medically necessary to insert a urinary catheter to fill the bladder.  Please call the Imaging Department at your exam site with any questions.            Oct 12, 2017  3:00 PM CDT   ESTABLISHED PRENATAL with Rafael Carrasco MD   [facility] ([facility])     [address] [address]  [place] [address] [phone]              Who to contact     If you have questions or need follow up information about today's clinic visit or your schedule please contact [facility] [place] directly at [phone].  Normal or non-critical lab and imaging results will be communicated to you by MyChart, letter or phone within 4 business days after the clinic has received the results. If you do not hear from us within 7 days, please contact the clinic through Yatownhart or phone. If you have a critical or abnormal lab result, we will notify you by phone as soon as possible.  Submit refill requests through Freak'n Genius or call your pharmacy and they will forward the refill request to us. Please allow 3 business days for your refill to be completed.          Additional Information About Your Visit        MyChart Information     [employer] gives you secure access to your electronic health record. If you see a primary care provider, you can also send messages to your care team and make appointments. If you have questions, please call your primary care clinic.  If you do not have a primary care provider, please call [phone] and they will assist you.        Care EveryWhere ID     This is your Care EveryWhere ID. This could be used by other organizations to access your [place] medical records  [id number]        Your Vitals Were     Last Period BMI (Body Mass Index)                05/02/2017 34.01 kg/m2           Blood Pressure from Last 3 Encounters:   09/05/17 122/64   08/04/17 120/82   07/07/17 112/72    Weight from Last 3 Encounters:   09/05/17 180 lb (81.6 kg)   08/04/17 181 lb (82.1 kg)   07/07/17 180 lb (81.6 kg)              We Performed the Following     Alpha fetoprotein maternal screen        Primary Care Provider Office Phone # Fax #    Marcio Harper [phone]       [facility] [address] [address]  [address]        Equal Access to Services     [facility] Hospital[facility]RISTINA : Hadii aad ku hadmaverickjami Borrego, scott benedict, kirtalina ag. So [facility] [phone].    ATENCIÓN: Si habla español, tiene a tang disposición servicios gratuitos de asistencia lingüística. Llame al [phone].    We comply with applicable federal civil rights laws and [place] laws. We do not discriminate on the basis of race, color, national origin, age, disability sex, sexual orientation or gender identity.            Thank you!     Thank you for choosing [facility]  for your care. Our goal is always to provide you with excellent care. Hearing back from our patients is one way we can continue to improve our services. Please take a few minutes to complete the written survey that you may receive in the mail after your visit with us. Thank you!             Your Updated Medication List - Protect others around you: Learn how to safely use, store and throw away your medicines at www.disposemymeds.org.      Notice  As of 9/5/2017  3:22 PM    You have not been prescribed any medications.

## 2017-09-06 LAB
# FETUSES US: NORMAL
AFP ADJ MOM AMN: 1.08
AFP SERPL-MCNC: 27 NG/ML
AGE - REPORTED: 27.4 YR
DATING METHOD: NORMAL
DIABETIC AT CONCEPTION: NO
FAMILY MEMBER DISEASES HX: NO
FAMILY MEMBER DISEASES HX: NO
GA METHOD: NORMAL
GA: 15.14 WEEKS
HX OF HEREDITARY DISORDERS: NO
IDDM PATIENT QL: NO
INTEGRATED SCN PATIENT-IMP: NORMAL
LMP START DATE: NORMAL
PREV HX CHROMOSOME ABNORMALITY: NORMAL
SPECIMEN DRAWN SERPL: NORMAL
TWINS: NORMAL

## 2017-10-12 ENCOUNTER — RADIANT APPOINTMENT (OUTPATIENT)
Dept: ULTRASOUND IMAGING | Facility: CLINIC | Age: 27
End: 2017-10-12
Payer: COMMERCIAL

## 2017-10-12 ENCOUNTER — PRENATAL OFFICE VISIT (OUTPATIENT)
Dept: OBGYN | Facility: CLINIC | Age: 27
End: 2017-10-12
Payer: COMMERCIAL

## 2017-10-12 VITALS — WEIGHT: 182 LBS | DIASTOLIC BLOOD PRESSURE: 60 MMHG | BODY MASS INDEX: 34.39 KG/M2 | SYSTOLIC BLOOD PRESSURE: 112 MMHG

## 2017-10-12 DIAGNOSIS — Z34.02 ENCOUNTER FOR SUPERVISION OF NORMAL FIRST PREGNANCY IN SECOND TRIMESTER: ICD-10-CM

## 2017-10-12 DIAGNOSIS — Z36.89 ENCOUNTER FOR FETAL ANATOMIC SURVEY: ICD-10-CM

## 2017-10-12 DIAGNOSIS — O28.3 ABNORMAL FETAL ULTRASOUND: Primary | ICD-10-CM

## 2017-10-12 PROCEDURE — 76805 OB US >/= 14 WKS SNGL FETUS: CPT | Performed by: OBSTETRICS & GYNECOLOGY

## 2017-10-12 PROCEDURE — 99207 ZZC PRENATAL VISIT: CPT | Performed by: OBSTETRICS & GYNECOLOGY

## 2017-10-12 NOTE — MR AVS SNAPSHOT
After Visit Summary   10/12/2017    Guerita SILVA    MRN: [id number]           Patient Information     Date Of Birth          1990        Visit Information        Provider Department      10/12/2017 3:00 PM Rafael Carrasco MD [facility]        Today's Diagnoses     Abnormal fetal ultrasound    -  1    Encounter for supervision of normal first pregnancy in second trimester           Follow-ups after your visit        Additional Services     MAT FETAL MED CTR REFERRAL-PREGNANCY       >> Patient may proceed with recommendations for further testing as directed by the Maternal Fetal Medicine Specialist >>    >> If requesting Fetal Echo: MFM will determine appropriate location for exam due to indication.    >> If requesting Lung Maturity Amnio:  If results indicate fetal lung maturity, induction or C/S is recommended within 36 hours.  Please schedule accordingly.     Dear Patient:   Please be aware that coverage of these services is subject to the terms and limitations of your health insurance plan.  Call member services at your health plan with any benefit or coverage questions.      Please bring the following to your appointment:    >>  Any x-rays, CTs or MRIs which have been performed.  Contact the facility where they were done to arrange for  prior to your scheduled appointment.  Any new CT, MRI or other procedures ordered by your specialist must be performed at a [facility] facility or coordinated by your clinic's referral office.  >>  List of current medications   >>  This referral request   >>  Any documents/labs given to you for this referral                  Who to contact     If you have questions or need follow up information about today's clinic visit or your schedule please contact [facility] [place] directly at [phone].  Normal or non-critical lab and imaging results will be communicated to you by MyChart, letter or phone within 4 business  days after the clinic has received the results. If you do not hear from us within 7 days, please contact the clinic through Winmedical or phone. If you have a critical or abnormal lab result, we will notify you by phone as soon as possible.  Submit refill requests through Winmedical or call your pharmacy and they will forward the refill request to us. Please allow 3 business days for your refill to be completed.          Additional Information About Your Visit        ELAN [employer]electronics[employer]BigBarn Information     Winmedical gives you secure access to your electronic health record. If you see a primary care provider, you can also send messages to your care team and make appointments. If you have questions, please call your primary care clinic.  If you do not have a primary care provider, please call [phone] and they will assist you.        Care EveryWhere ID     This is your Care EveryWhere ID. This could be used by other organizations to access your [place] medical records  [id number]        Your Vitals Were     Last Period BMI (Body Mass Index)                05/02/2017 34.39 kg/m2           Blood Pressure from Last 3 Encounters:   10/12/17 112/60   09/05/17 122/64   08/04/17 120/82    Weight from Last 3 Encounters:   10/12/17 182 lb (82.6 kg)   09/05/17 180 lb (81.6 kg)   08/04/17 181 lb (82.1 kg)              We Performed the Following     MAT FETAL MED CTR REFERRAL-PREGNANCY        Primary Care Provider Office Phone # Fax #    Marcio Alan Harper [phone]       [address] [address]        Equal Access to Services     [facility] HospitalKRISTINA : Hadii aad ku hadasho Soomaali, waaxda luqadaha, qaybta kaalmada safia, alina hylton . So [facility] [phone].    ATENCIÓN: Si habla español, tiene a tang disposición servicios gratuitos de asistencia lingüística. Llame al [phone].    We comply with applicable federal civil rights laws and [place] laws. We  do not discriminate on the basis of race, color, national origin, age, disability, sex, sexual orientation, or gender identity.            Thank you!     Thank you for choosing [facility] [place]  for your care. Our goal is always to provide you with excellent care. Hearing back from our patients is one way we can continue to improve our services. Please take a few minutes to complete the written survey that you may receive in the mail after your visit with us. Thank you!             Your Updated Medication List - Protect others around you: Learn how to safely use, store and throw away your medicines at www.disposemymeds.org.      Notice  As of 10/12/2017  4:08 PM    You have not been prescribed any medications.

## 2017-10-12 NOTE — PROGRESS NOTES
U/S today shows cystic right lung. Possible CCAM.  Will get level II u/s for better characterization and diagnosis.  Pt and  aware there is cystic area on u/s but CCAM was not reviewed yet until confirmed.

## 2017-10-16 ENCOUNTER — PRE VISIT (OUTPATIENT)
Dept: MATERNAL FETAL MEDICINE | Facility: CLINIC | Age: 27
End: 2017-10-16

## 2017-10-19 ENCOUNTER — OFFICE VISIT (OUTPATIENT)
Dept: MATERNAL FETAL MEDICINE | Facility: CLINIC | Age: 27
End: 2017-10-19
Attending: OBSTETRICS & GYNECOLOGY
Payer: COMMERCIAL

## 2017-10-19 ENCOUNTER — HOSPITAL ENCOUNTER (OUTPATIENT)
Dept: ULTRASOUND IMAGING | Facility: CLINIC | Age: 27
Discharge: HOME | End: 2017-10-19
Attending: OBSTETRICS & GYNECOLOGY | Admitting: OBSTETRICS & GYNECOLOGY
Payer: COMMERCIAL

## 2017-10-19 DIAGNOSIS — Q33.0 CONGENITAL PULMONARY AIRWAY MALFORMATION (CPAM): ICD-10-CM

## 2017-10-19 DIAGNOSIS — O26.90 PREGNANCY RELATED CONDITION, ANTEPARTUM: ICD-10-CM

## 2017-10-19 DIAGNOSIS — O35.CXX0 PREGNANCY COMPLICATED BY FETAL LUNG LESION, NOT APPLICABLE OR UNSPECIFIED FETUS: Primary | ICD-10-CM

## 2017-10-19 PROCEDURE — 76811 OB US DETAILED SNGL FETUS: CPT

## 2017-10-19 NOTE — MR AVS SNAPSHOT
After Visit Summary   10/19/2017    Guerita SILVA    MRN: [id number]           Patient Information     Date Of Birth          1990        Visit Information        Provider Department      10/19/2017 10:00 AM Lazara Brumfield DO [facility] Maternal Fetal Medicine [facility]        Today's Diagnoses     Pregnancy complicated by fetal lung lesion, not applicable or unspecified fetus    -  1    Congenital pulmonary airway malformation (CPAM)           Follow-ups after your visit        Your next 10 appointments already scheduled     Oct 26, 2017 10:15 AM CDT   MFM US COMPRE SINGLE F/U with [id number]   [facility] Maternal Fetal Medicine Ultrasound - [facility] ([facility])    [address] [place] [address]  [facility] [id number] [phone]           Wear comfortable clothes and leave your valuables at home.            Oct 26, 2017 10:45 AM CDT   Radiology MD with [facility]   [facility] Maternal Fetal Medicine [facility] ([facility])    [address] [place] [address]  [facility] [id number] [phone]           Please arrive at the time given for your first appointment. This visit is used internally to schedule the physician's time during your ultrasound.              Future tests that were ordered for you today     Open Future Orders        Priority Expected Expires Ordered    MFM US Comprehensive Single F/U Routine  10/19/2018 10/19/2017            Who to contact     If you have questions or need follow up information about today's clinic visit or your schedule please contact [facility] MATERNAL FETAL MEDIC[facility] directly at [phone].  Normal or non-critical lab and imaging results will be communicated to you by MyChart, letter or phone within 4 business days after the clinic has received the results. If you do not hear from us within 7 days, please contact the clinic through MyChart or phone. If you have a critical or abnormal lab result, we  will notify you by phone as soon as possible.  Submit refill requests through K[employer] Prime or call your pharmacy and they will forward the refill request to us. Please allow 3 business days for your refill to be completed.          Additional Information About Your Visit        Pressure BioScienceshart Information     [employer] gives you secure access to your electronic health record. If you see a primary care provider, you can also send messages to your care team and make appointments. If you have questions, please call your primary care clinic.  If you do not have a primary care provider, please call [phone] and they will assist you.        Care EveryWhere ID     This is your Care EveryWhere ID. This could be used by other organizations to access your [place] medical records  [id number]        Your Vitals Were     Last Period                   05/02/2017            Blood Pressure from Last 3 Encounters:   10/12/17 112/60   09/05/17 122/64   08/04/17 120/82    Weight from Last 3 Encounters:   10/12/17 82.6 kg (182 lb)   09/05/17 81.6 kg (180 lb)   08/04/17 82.1 kg (181 lb)               Primary Care Provider Office Phone # Fax #    Marcio Alan Harper [phone]       [facility] [address] [facility] [address]        Equal Access to Services     JUANITO PEDRAZA AH: Hadii aad ku hadasho Soomaali, waaxda luqadaha, qaybta kaalmada adeegyada, alina de la fuente hayhoang carpenter. So [facility] [phone].    ATENCIÓN: Si habla español, tiene a tang disposición servicios gratuitos de asistencia lingüística. Llame al [phone].    We comply with applicable federal civil rights laws and [place] laws. We do not discriminate on the basis of race, color, national origin, age, disability, sex, sexual orientation, or gender identity.            Thank you!     Thank you for choosing [facility]  for your care. Our goal is always to provide you with excellent care.  Hearing back from our patients is one way we can continue to improve our services. Please take a few minutes to complete the written survey that you may receive in the mail after your visit with us. Thank you!             Your Updated Medication List - Protect others around you: Learn how to safely use, store and throw away your medicines at www.disposemymeds.org.      Notice  As of 10/19/2017 11:59 PM    You have not been prescribed any medications.

## 2017-10-20 NOTE — PROGRESS NOTES
"Please see \"Imaging\" tab under \"Chart Review\" for details of today's US.    Lazara Brumfield, DO    "

## 2017-10-26 ENCOUNTER — OFFICE VISIT (OUTPATIENT)
Dept: MATERNAL FETAL MEDICINE | Facility: CLINIC | Age: 27
End: 2017-10-26
Attending: OBSTETRICS & GYNECOLOGY
Payer: COMMERCIAL

## 2017-10-26 ENCOUNTER — HOSPITAL ENCOUNTER (OUTPATIENT)
Dept: ULTRASOUND IMAGING | Facility: CLINIC | Age: 27
Discharge: HOME | End: 2017-10-26
Attending: OBSTETRICS & GYNECOLOGY | Admitting: OBSTETRICS & GYNECOLOGY
Payer: COMMERCIAL

## 2017-10-26 DIAGNOSIS — Q33.0 CONGENITAL PULMONARY AIRWAY MALFORMATION (CPAM): ICD-10-CM

## 2017-10-26 DIAGNOSIS — O35.9XX0 SUSPECTED FETAL ABNORMALITY AFFECTING MANAGEMENT OF MOTHER, ANTEPARTUM, SINGLE OR UNSPECIFIED FETUS: Primary | ICD-10-CM

## 2017-10-26 PROCEDURE — 76816 OB US FOLLOW-UP PER FETUS: CPT

## 2017-10-26 NOTE — PROGRESS NOTES
"Please see \"Imaging\" tab under \"Chart Review\" for details of today's US.      Lizzette Ambrocio, DO  Maternal-Fetal Medicine        "

## 2017-10-26 NOTE — MR AVS SNAPSHOT
After Visit Summary   10/26/2017    Guerita SILVA    MRN: [id number]           Patient Information     Date Of Birth          1990        Visit Information        Provider Department      10/26/2017 10:45 AM Lizzette Ambrocio DO [facility] Maternal Fetal Medicine [facility]        Today's Diagnoses     Suspected fetal abnormality affecting management of mother, antepartum, single or unspecified fetus    -  1    Congenital pulmonary airway malformation (CPAM)           Follow-ups after your visit        Your next 10 appointments already scheduled     Nov 02, 2017  8:45 AM CDT   MFM US COMPRE SINGLE F/U with [id number]   [facility] Maternal Fetal Medicine Ultrasound [facility] ([facility])    [address] [place] [address]  [facility] [address]   [phone]           Wear comfortable clothes and leave your valuables at home.            Nov 02, 2017  9:15 AM CDT   Radiology MD with RADHA PEPE MD   [facility] Maternal Fetal Medicine [facility])    [address] [place] [address]   [phone]           Please arrive at the time given for your first appointment. This visit is used internally to schedule the physician's time during your ultrasound.            Nov 09, 2017  8:20 AM CST   ESTABLISHED PRENATAL with Rafael Carrasco MD   [facility] ([facility])    [address] [place] [address]  [facility] [address]   [phone]            Nov 09, 2017  9:30 AM CST   MFM US COMPRE SINGLE F/U with [id number]   [facility] Maternal Fetal Medicine Ultrasound [facility] ([facility])    [address] [place] [address]  [facility] [address]   [phone]           Wear comfortable clothes and leave your valuables at home.            Nov 09, 2017 10:00 AM CST   Radiology MD with RADHA PEPE MD   [facility] Maternal Fetal Medicine [facility] ([facility])    [address] [place] [address]  [facility] [address]  [facility] [address]   [phone]           Please arrive at the time given for your first appointment. This visit is used internally to schedule the physician's time during your ultrasound.            Nov 16, 2017  9:30 AM CST   MFM US COMPRE SINGLE F/U with [id number]   [facility] Maternal Fetal Medicine Ultrasound - [facility] ([facility])    [address] [place] [address] [address]  [facility] [address] [phone]           Wear comfortable clothes and leave your valuables at home.            Nov 16, 2017 10:00 AM CST   Radiology MD with SH AFSHIN ROSE   [facility] Maternal Fetal Medicine [facility] ([facility])    [address] [place] [address]  [facility] [address] [phone]           Please arrive at the time given for your first appointment. This visit is used internally to schedule the physician's time during your ultrasound.            Dec 07, 2017  8:20 AM CST   ESTABLISHED PRENATAL with Rafael Carrasco MD   [facility] ([facility])    [address] [place] [address] [address]  [facility] [address] [phone]              Future tests that were ordered for you today     Open Future Orders        Priority Expected Expires Ordered    [facility] US Comprehensive Single F/U Routine 11/2/2017 10/26/2018 10/26/2017    MFM US Comprehensive Single F/U Routine 11/9/2017 10/26/2018 10/26/2017    MFM US Comprehensive Single F/U Routine 11/16/2017 10/26/2018 10/26/2017            Who to contact     If you have questions or need follow up information about today's clinic visit or your schedule please contact [facility] MATERNAL FETAL MEDICINE [facility] directly at [phone].  Normal or non-critical lab and imaging results will be communicated to you by MyChart, letter or phone within 4 business days after the clinic has received the results. If you do not hear from us within 7 days, please contact the clinic through HealOrhart or  phone. If you have a critical or abnormal lab result, we will notify you by phone as soon as possible.  Submit refill requests through C[employer] or call your pharmacy and they will forward the refill request to us. Please allow 3 business days for your refill to be completed.          Additional Information About Your Visit        Keenjarhart Information     [employer] gives you secure access to your electronic health record. If you see a primary care provider, you can also send messages to your care team and make appointments. If you have questions, please call your primary care clinic.  If you do not have a primary care provider, please call [phone] and they will assist you.        Care EveryWhere ID     This is your Care EveryWhere ID. This could be used by other organizations to access your [place] medical records  [id number]        Your Vitals Were     Last Period                   05/02/2017            Blood Pressure from Last 3 Encounters:   10/12/17 112/60   09/05/17 122/64   08/04/17 120/82    Weight from Last 3 Encounters:   10/12/17 82.6 kg (182 lb)   09/05/17 81.6 kg (180 lb)   08/04/17 82.1 kg (181 lb)               Primary Care Provider Office Phone # Fax #    Marcio Alan Harper [phone]       [facility] [address] [facility] [address]        Equal Access to Services     [facility] MILO : Hadii aad ku hadasho Soomaali, waaxda luqadaha, qaybta kaalmada adeegyada, alina carpenter. So [facility] [phone].    ATENCIÓN: Si habla español, tiene a tang disposición servicios gratuitos de asistencia lingüística. Llame al [phone].    We comply with applicable federal civil rights laws and [place] laws. We do not discriminate on the basis of race, color, national origin, age, disability, sex, sexual orientation, or gender identity.            Thank you!     Thank you for choosing [facility]  for your care.  Our goal is always to provide you with excellent care. Hearing back from our patients is one way we can continue to improve our services. Please take a few minutes to complete the written survey that you may receive in the mail after your visit with us. Thank you!             Your Updated Medication List - Protect others around you: Learn how to safely use, store and throw away your medicines at www.disposemymeds.org.      Notice  As of 10/26/2017 12:06 PM    You have not been prescribed any medications.

## 2017-11-02 ENCOUNTER — HOSPITAL ENCOUNTER (OUTPATIENT)
Dept: ULTRASOUND IMAGING | Facility: CLINIC | Age: 27
Discharge: HOME | End: 2017-11-02
Attending: OBSTETRICS & GYNECOLOGY | Admitting: OBSTETRICS & GYNECOLOGY
Payer: COMMERCIAL

## 2017-11-02 ENCOUNTER — OFFICE VISIT (OUTPATIENT)
Dept: MATERNAL FETAL MEDICINE | Facility: CLINIC | Age: 27
End: 2017-11-02
Attending: OBSTETRICS & GYNECOLOGY
Payer: COMMERCIAL

## 2017-11-02 DIAGNOSIS — O35.CXX0 PREGNANCY COMPLICATED BY FETAL LUNG LESION, NOT APPLICABLE OR UNSPECIFIED FETUS: Primary | ICD-10-CM

## 2017-11-02 DIAGNOSIS — O35.9XX0 SUSPECTED FETAL ABNORMALITY AFFECTING MANAGEMENT OF MOTHER, ANTEPARTUM, SINGLE OR UNSPECIFIED FETUS: ICD-10-CM

## 2017-11-02 DIAGNOSIS — Q33.0 CONGENITAL PULMONARY AIRWAY MALFORMATION (CPAM): ICD-10-CM

## 2017-11-02 PROCEDURE — 76816 OB US FOLLOW-UP PER FETUS: CPT

## 2017-11-02 NOTE — MR AVS SNAPSHOT
After Visit Summary   11/2/2017    Guerita SILVA    MRN: [id number]           Patient Information     Date Of Birth          1990        Visit Information        Provider Department      11/2/2017 9:15 AM Lazara Brumfield DO [facility] Maternal Fetal Medicine [facility]        Today's Diagnoses     Pregnancy complicated by fetal lung lesion, not applicable or unspecified fetus    -  1    Congenital pulmonary airway malformation (CPAM)           Follow-ups after your visit        Your next 10 appointments already scheduled     Nov 09, 2017  8:20 AM CST   ESTABLISHED PRENATAL with Rafael Carrasco MD   [facility] [place] ([facility])    [address] [place] [address] [address]  [facility] [address]   [phone]            Nov 09, 2017  9:30 AM CST   MFM US COMPRE SINGLE F/U with SHMFMUSR3   [facility] Maternal Fetal Medicine Ultrasound [facility] ([facility])    [address] [place] [address] [address]  [facility] [address]   [phone]           Wear comfortable clothes and leave your valuables at home.            Nov 09, 2017 10:00 AM CST   Radiology MD with RADHA PEPE MD   [facility] Maternal Fetal Medicine [facility] ([facility])    [address] [place] [address]   [phone]           Please arrive at the time given for your first appointment. This visit is used internally to schedule the physician's time during your ultrasound.            Nov 16, 2017  9:30 AM CST   MFM US COMPRE SINGLE F/U with SHMFMUSR1   [facility] Maternal Fetal Medicine Ultrasound [facility] ([facility])    [address] [place] [address]   [phone]           Wear comfortable clothes and leave your valuables at home.            Nov 16, 2017 10:00 AM CST   Radiology MD with RADHA PEPE MD   [facility] Maternal Fetal Medicine [facility] ([facility])    [address] [place] [address]  [facility]  [address]  [facility] [address] [phone]           Please arrive at the time given for your first appointment. This visit is used internally to schedule the physician's time during your ultrasound.            Dec 07, 2017  8:20 AM CST   ESTABLISHED PRENATAL with Rafael Carrasco MD   [facility] [place] ([facility] [place])    [address] [facility] [address]  [place] [address] [phone]              Who to contact     If you have questions or need follow up information about today's clinic visit or your schedule please contact [facility] MATERNAL FETAL MEDICINE [facility] directly at [phone].  Normal or non-critical lab and imaging results will be communicated to you by Metabolixhart, letter or phone within 4 business days after the clinic has received the results. If you do not hear from us within 7 days, please contact the clinic through Metabolixhart or phone. If you have a critical or abnormal lab result, we will notify you by phone as soon as possible.  Submit refill requests through Desire2Learn or call your pharmacy and they will forward the refill request to us. Please allow 3 business days for your refill to be completed.          Additional Information About Your Visit        [employer] Information     [employer] gives you secure access to your electronic health record. If you see a primary care provider, you can also send messages to your care team and make appointments. If you have questions, please call your primary care clinic.  If you do not have a primary care provider, please call [phone] and they will assist you.        Care EveryWhere ID     This is your Care EveryWhere ID. This could be used by other organizations to access your [place] medical records  [id number]        Your Vitals Were     Last Period                   05/02/2017            Blood Pressure from Last 3 Encounters:   10/12/17 112/60   09/05/17 122/64   08/04/17 120/82    Weight from Last 3  Encounters:   10/12/17 82.6 kg (182 lb)   09/05/17 81.6 kg (180 lb)   08/04/17 82.1 kg (181 lb)              Today, you had the following     No orders found for display       Primary Care Provider Office Phone # Fax #    Marcio Alan Harper [phone]       [facility] [address] [facility]   [facility] [address]        Equal Access to Services     JUANITO PEDRAZA : Hadii aad ku hadasho Soomaali, waaxda luqadaha, qaybta kaalmada adeegyada, waxay idiin hayaan adeeg haiarawalter la'hoang . So [facility] [phone].    ATENCIÓN: Si habla español, tiene a tang disposición servicios gratuitos de asistencia lingüística. [facility] [phone].    We comply with applicable federal civil rights laws and [place] laws. We do not discriminate on the basis of race, color, national origin, age, disability, sex, sexual orientation, or gender identity.            Thank you!     Thank you for choosing [facility]  for your care. Our goal is always to provide you with excellent care. Hearing back from our patients is one way we can continue to improve our services. Please take a few minutes to complete the written survey that you may receive in the mail after your visit with us. Thank you!             Your Updated Medication List - Protect others around you: Learn how to safely use, store and throw away your medicines at www.disposemymeds.org.      Notice  As of 11/2/2017 10:00 AM    You have not been prescribed any medications.

## 2017-11-09 ENCOUNTER — OFFICE VISIT (OUTPATIENT)
Dept: MATERNAL FETAL MEDICINE | Facility: CLINIC | Age: 27
End: 2017-11-09
Attending: OBSTETRICS & GYNECOLOGY
Payer: COMMERCIAL

## 2017-11-09 ENCOUNTER — HOSPITAL ENCOUNTER (OUTPATIENT)
Dept: ULTRASOUND IMAGING | Facility: CLINIC | Age: 27
Discharge: HOME | End: 2017-11-09
Attending: OBSTETRICS & GYNECOLOGY | Admitting: OBSTETRICS & GYNECOLOGY
Payer: COMMERCIAL

## 2017-11-09 ENCOUNTER — PRENATAL OFFICE VISIT (OUTPATIENT)
Dept: OBGYN | Facility: CLINIC | Age: 27
End: 2017-11-09
Payer: COMMERCIAL

## 2017-11-09 VITALS — DIASTOLIC BLOOD PRESSURE: 74 MMHG | SYSTOLIC BLOOD PRESSURE: 116 MMHG | BODY MASS INDEX: 34.77 KG/M2 | WEIGHT: 184 LBS

## 2017-11-09 DIAGNOSIS — O35.CXX0 PREGNANCY COMPLICATED BY FETAL LUNG LESION, SINGLE OR UNSPECIFIED FETUS: Primary | ICD-10-CM

## 2017-11-09 DIAGNOSIS — O35.9XX0 SUSPECTED ABNORMALITY OF FETUS AFFECTING MANAGEMENT OF MOTHER IN SINGLETON PREGNANCY: ICD-10-CM

## 2017-11-09 DIAGNOSIS — O35.9XX0 KNOWN FETAL ANOMALY, ANTEPARTUM, SINGLE OR UNSPECIFIED FETUS: Primary | ICD-10-CM

## 2017-11-09 DIAGNOSIS — O35.9XX0 SUSPECTED FETAL ABNORMALITY AFFECTING MANAGEMENT OF MOTHER, ANTEPARTUM, SINGLE OR UNSPECIFIED FETUS: ICD-10-CM

## 2017-11-09 PROCEDURE — 76816 OB US FOLLOW-UP PER FETUS: CPT

## 2017-11-09 PROCEDURE — 99207 ZZC PRENATAL VISIT: CPT | Performed by: OBSTETRICS & GYNECOLOGY

## 2017-11-09 NOTE — MR AVS SNAPSHOT
After Visit Summary   11/9/2017    Guerita SILVA    MRN: [id number]           Patient Information     Date Of Birth          1990        Visit Information        Provider Department      11/9/2017 8:20 AM Rafael Carrasco MD [facility] [place]        Today's Diagnoses     Pregnancy complicated by fetal lung lesion, single or unspecified fetus    -  1       Follow-ups after your visit        Your next 10 appointments already scheduled     Nov 15, 2017  9:30 AM CST   Ech Fetal Complete* with Urmfmusfet, RVPUSR5   [facility] Echo/EKG ([facility])    [address]  [place] [address]               Nov 15, 2017 10:15 AM CST   MFM US COMPRE SINGLE F/U with URMFMUSR2   [facility] - [facility])    [address]  [facility] [address] [phone]           Wear comfortable clothes and leave your valuables at home.            Nov 15, 2017 10:45 AM CST   Radiology MD with HILLARY PEPE MD   [facility] [place] ([facility])    [address]  [facility] [address]   [phone]           Please arrive at the time given for your first appointment. This visit is used internally to schedule the physician's time during your ultrasound.            Nov 22, 2017  9:30 AM CST   MFM US COMPRE SINGLE F/U with SHMFMUSR2   [facility] - [facility] ([facility])    [address] [facility] [address]  [facility] [address] [phone]           Wear comfortable clothes and leave your valuables at home.            Nov 22, 2017 10:00 AM CST   Radiology MD with SH AFSHIN ROSE   [facility] ([facility])    [address] [facility] [address]  [place] [id number] [phone]           Please arrive at the time given for your first  appointment. This visit is used internally to schedule the physician's time during your ultrasound.            Nov 30, 2017  9:30 AM CST   MFM US COMPRE SINGLE F/U with SHMFMUSR1   [facility] Maternal Fetal Medicine Ultrasound - [facility] ([facility])    [address] [place] [address]  [facility] [address] [phone]           Wear comfortable clothes and leave your valuables at home.            Nov 30, 2017 10:00 AM CST   Radiology MD with [facility] ROSE   [facility] Maternal Fetal Medicine [facility] ([facility])    [address] [place] [address]  [facility] [address] [phone]           Please arrive at the time given for your first appointment. This visit is used internally to schedule the physician's time during your ultrasound.            Dec 07, 2017  8:20 AM CST   ESTABLISHED PRENATAL with Rafael Carrasco MD   [facility] [place] ([facility])    [address] [facility] [address]  [facility] [address] [phone]              Future tests that were ordered for you today     Open Future Orders        Priority Expected Expires Ordered    Maternal Fetal US Comprehensive Single F/U Routine 11/23/2017 11/9/2018 11/9/2017    MFM US Comprehensive Single F/U Routine 11/30/2017 11/9/2018 11/9/2017    Echo Fetal Complete-Peds Cardiology Routine  11/9/2018 11/9/2017            Who to contact     If you have questions or need follow up information about today's clinic visit or your schedule please contact [facility] [place] directly at [phone].  Normal or non-critical lab and imaging results will be communicated to you by MyChart, letter or phone within 4 business days after the clinic has received the results. If you do not hear from us within 7 days, please contact the clinic through MyChart or phone. If you have a critical or abnormal lab result, we will notify you by phone as soon as possible.  Submit refill requests  through Personal Web Systems or call your pharmacy and they will forward the refill request to us. Please allow 3 business days for your refill to be completed.          Additional Information About Your Visit        [employer] Information     [employer] gives you secure access to your electronic health record. If you see a primary care provider, you can also send messages to your care team and make appointments. If you have questions, please call your primary care clinic.  If you do not have a primary care provider, please call [phone] and they will assist you.        Care EveryWhere ID     This is your Care EveryWhere ID. This could be used by other organizations to access your [place] medical records  [id number]        Your Vitals Were     Last Period BMI (Body Mass Index)                05/02/2017 34.77 kg/m2           Blood Pressure from Last 3 Encounters:   11/09/17 116/74   10/12/17 112/60   09/05/17 122/64    Weight from Last 3 Encounters:   11/09/17 184 lb (83.5 kg)   10/12/17 182 lb (82.6 kg)   09/05/17 180 lb (81.6 kg)              Today, you had the following     No orders found for display       Primary Care Provider Office Phone # Fax #    Rafael Carrasco [phone] [address] [address]        Equal Access to Services     JUANITO PEDRAZA AH: Hadii jose ku hadasho Soomaali, waaxda luqadaha, qaybta kaalmada adeegyada, alina de la fuente hayhoang hylton . So [facility] [phone].    ATENCIÓN: Si habla español, tiene a tang disposición servicios gratuitos de asistencia lingüística. Llame al [phone].    We comply with applicable federal civil rights laws and [place] laws. We do not discriminate on the basis of race, color, national origin, age, disability, sex, sexual orientation, or gender identity.            Thank you!     Thank you for choosing [facility]  for your care. Our goal is always to provide you with excellent care. Hearing back from  our patients is one way we can continue to improve our services. Please take a few minutes to complete the written survey that you may receive in the mail after your visit with us. Thank you!             Your Updated Medication List - Protect others around you: Learn how to safely use, store and throw away your medicines at www.disposemymeds.org.      Notice  As of 11/9/2017  1:14 PM    You have not been prescribed any medications.

## 2017-11-09 NOTE — MR AVS SNAPSHOT
After Visit Summary   11/9/2017    Guerita SILVA    MRN: [id number]           Patient Information     Date Of Birth          1990        Visit Information        Provider Department      11/9/2017 10:00 AM Cande Joseph MD [facility] Maternal Fetal Medicine [facility]        Today's Diagnoses     Known fetal anomaly, antepartum, single or unspecified fetus    -  1    Suspected abnormality of fetus affecting management of mother in k[place] pregnancy           Follow-ups after your visit        Your next 10 appointments already scheduled     Nov 15, 2017  9:30 AM CST   Ech Fetal Complete* with Urmfmusfet, RVPUSR5   [facility] Echo/EKG ([facility])    [address]  [facility] [address]               Nov 15, 2017 10:15 AM CST   MFM US COMPRE SINGLE F/U with URMFMUSR2   [facility] Maternal Fetal Medicine Ultrasound - [place] ([facility])    [address]  [facility] [address] [phone]           Wear comfortable clothes and leave your valuables at home.            Nov 15, 2017 10:45 AM CST   Radiology MD with UR AFSHIN ROSE   [facility] Maternal Fetal Medicine - [place] ([facility])    [address]  [facility] [address]   [phone]           Please arrive at the time given for your first appointment. This visit is used internally to schedule the physician's time during your ultrasound.            Nov 22, 2017  9:30 AM CST   MFM US COMPRE SINGLE F/U with SHMFMUSR2   [facility] Maternal Fetal Medicine Ultrasound - [facility] ([facility])    [address] [place] [address]   [phone]           Wear comfortable clothes and leave your valuables at home.            Nov 22, 2017 10:00 AM CST   Radiology MD with RADHA PEPE MD   [facility] Maternal Fetal Medicine - [facility] ([facility])    [address] [facility]  [address]  [place] [address]   [phone]           Please arrive at the time given for your first appointment. This visit is used internally to schedule the physician's time during your ultrasound.            Nov 30, 2017  9:30 AM CST   MFM US COMPRE SINGLE F/U with SHMFMUSR1   [facility] Maternal Fetal Medicine Ultrasound - [facility] ([facility])    [address]  [place] [address] [phone]           Wear comfortable clothes and leave your valuables at home.            Nov 30, 2017 10:00 AM CST   Radiology MD with SH AFSHIN ROSE   [facility] Maternal Fetal Medicine [facility] ([facility])    [address]  [facility] [address] [phone]           Please arrive at the time given for your first appointment. This visit is used internally to schedule the physician's time during your ultrasound.            Dec 07, 2017  8:20 AM CST   ESTABLISHED PRENATAL with Rafael Carrasco MD   [facility] [place] ([facility] [place])    [address]  [place] [address] [phone]              Future tests that were ordered for you today     Open Future Orders        Priority Expected Expires Ordered    Maternal Fetal US Comprehensive Single F/U Routine 11/23/2017 11/9/2018 11/9/2017    MFM US Comprehensive Single F/U Routine 11/30/2017 11/9/2018 11/9/2017    Echo Fetal Complete-Peds Cardiology Routine  11/9/2018 11/9/2017            Who to contact     If you have questions or need follow up information about today's clinic visit or your schedule please contact [facility] MATERNAL FETAL MEDICINE [facility] directly at [phone].  Normal or non-critical lab and imaging results will be communicated to you by MyChart, letter or phone within 4 business days after the clinic has received the results. If you do not hear from us within 7 days, please contact the clinic through MyChart or phone. If you have  a critical or abnormal lab result, we will notify you by phone as soon as possible.  Submit refill requests through [employer] or call your pharmacy and they will forward the refill request to us. Please allow 3 business days for your refill to be completed.          Additional Information About Your Visit        Mindset Media[employer] Information     [employer] gives you secure access to your electronic health record. If you see a primary care provider, you can also send messages to your care team and make appointments. If you have questions, please call your primary care clinic.  If you do not have a primary care provider, please call [phone] and they will assist you.        Care EveryWhere ID     This is your Care EveryWhere ID. This could be used by other organizations to access your [place] medical records  [id number]        Your Vitals Were     Last Period                   05/02/2017            Blood Pressure from Last 3 Encounters:   11/09/17 116/74   10/12/17 112/60   09/05/17 122/64    Weight from Last 3 Encounters:   11/09/17 83.5 kg (184 lb)   10/12/17 82.6 kg (182 lb)   09/05/17 81.6 kg (180 lb)               Primary Care Provider Office Phone # Fax #    Rafael Carrasco [phone] [address] [address]        Equal Access to Services     JUANITO PEDRAZA AH: Hadii aad ku hadasho Soomaali, waaxda luqadaha, qaybta kaalmada adeegyada, alina de la fuente hayhoang hylton . So [facility] [phone].    ATENCIÓN: Si habla español, tiene a tang disposición servicios gratuitos de asistencia lingüística. Llame al [phone].    We comply with applicable federal civil rights laws and [place] laws. We do not discriminate on the basis of race, color, national origin, age, disability, sex, sexual orientation, or gender identity.            Thank you!     Thank you for choosing [facility]  for your care. Our goal is always to provide you with excellent  care. Hearing back from our patients is one way we can continue to improve our services. Please take a few minutes to complete the written survey that you may receive in the mail after your visit with us. Thank you!             Your Updated Medication List - Protect others around you: Learn how to safely use, store and throw away your medicines at www.disposemymeds.org.      Notice  As of 11/9/2017  1:17 PM    You have not been prescribed any medications.

## 2017-11-09 NOTE — PROGRESS NOTES
Please see ultrasound report under imaging tab for details on ultrasound performed today.    Cande Joseph MD  , OB/GYN  Maternal-Fetal Medicine  [email or web address]  [phone] (Academic office)  [phone] (Pager)

## 2017-11-15 ENCOUNTER — OFFICE VISIT (OUTPATIENT)
Dept: MATERNAL FETAL MEDICINE | Facility: CLINIC | Age: 27
End: 2017-11-15
Attending: OBSTETRICS & GYNECOLOGY
Payer: COMMERCIAL

## 2017-11-15 ENCOUNTER — HOSPITAL ENCOUNTER (OUTPATIENT)
Dept: ULTRASOUND IMAGING | Facility: CLINIC | Age: 27
Discharge: HOME | End: 2017-11-15
Attending: OBSTETRICS & GYNECOLOGY | Admitting: SOCIAL WORKER
Payer: COMMERCIAL

## 2017-11-15 ENCOUNTER — HOSPITAL ENCOUNTER (OUTPATIENT)
Dept: CARDIOLOGY | Facility: CLINIC | Age: 27
End: 2017-11-15
Attending: OBSTETRICS & GYNECOLOGY
Payer: COMMERCIAL

## 2017-11-15 DIAGNOSIS — O35.CXX0 PREGNANCY COMPLICATED BY FETAL LUNG LESION, SINGLE OR UNSPECIFIED FETUS: Primary | ICD-10-CM

## 2017-11-15 DIAGNOSIS — O35.9XX0 SUSPECTED FETAL ABNORMALITY AFFECTING MANAGEMENT OF MOTHER, ANTEPARTUM, SINGLE OR UNSPECIFIED FETUS: ICD-10-CM

## 2017-11-15 DIAGNOSIS — O35.9XX0 SUSPECTED ABNORMALITY OF FETUS AFFECTING MANAGEMENT OF MOTHER IN SINGLETON PREGNANCY: ICD-10-CM

## 2017-11-15 DIAGNOSIS — Q33.0 CONGENITAL PULMONARY AIRWAY MALFORMATION (CPAM): Primary | ICD-10-CM

## 2017-11-15 PROCEDURE — 76816 OB US FOLLOW-UP PER FETUS: CPT

## 2017-11-15 PROCEDURE — 76825 ECHO EXAM OF FETAL HEART: CPT

## 2017-11-15 NOTE — PROGRESS NOTES
Please see ultrasound report under imaging tab for details on ultrasound performed today.    Cande Joseph MD  , OB/GYN  Maternal-Fetal Medicine  [email or web address]  [phone] (Academic office)  [phone] (Pager)

## 2017-11-15 NOTE — MR AVS SNAPSHOT
After Visit Summary   11/15/2017    Guerita SILVA    MRN: [id number]           Patient Information     Date Of Birth          1990        Visit Information        Provider Department      11/15/2017 10:45 AM Cande Joseph MD [facility] Maternal Fetal Medicine [facility]        Today's Diagnoses     Congenital pulmonary airway malformation (CPAM)    -  1       Follow-ups after your visit        Your next 10 appointments already scheduled     Nov 22, 2017  9:30 AM CST   MFM US COMPRE SINGLE F/U with SHMFMUSR2   [facility] Maternal Fetal Medicine Ultrasound - [facility] ([facility])    [address] [place] [address]   [phone]           Wear comfortable clothes and leave your valuables at home.            Nov 22, 2017 10:00 AM CST   Radiology MD with RADHA PEPE MD   [facility] M[facility]etal [facility])    [address] [place] [address]   [phone]           Please arrive at the time given for your first appointment. This visit is used internally to schedule the physician's time during your ultrasound.            Nov 30, 2017  9:30 AM CST   MFM US COMPRE SINGLE F/U with SHMFMUSR1   [facility] Maternal [facility]etal Medicine Ultrasound - [facility] ([facility])    [address] [place] [address]   [phone]           Wear comfortable clothes and leave your valuables at home.            Nov 30, 2017 10:00 AM CST   Radiology MD with RADHA PEPE MD   [facility]etal [facility])    [address] [place] [address]   [phone]           Please arrive at the time given for your first appointment. This visit is used internally to schedule the physician's time during your ultrasound.            Dec 07, 2017  8:20 AM CST   ESTABLISHED PRENATAL with Rafael Carrasco MD   [facility]  [place] ([facility] [place])    [address]  [address]  [place] [address] [phone]              Who to contact     If you have questions or need follow up information about today's clinic visit or your schedule please contact [facility] MATERNAL FETAL MEDICINE [facility] directly at [phone].  Normal or non-critical lab and imaging results will be communicated to you by MyChart, letter or phone within 4 business days after the clinic has received the results. If you do not hear from us within 7 days, please contact the clinic through VFAhart or phone. If you have a critical or abnormal lab result, we will notify you by phone as soon as possible.  Submit refill requests through [employer] or call your pharmacy and they will forward the refill request to us. Please allow 3 business days for your refill to be completed.          Additional Information About Your Visit        [email or web address] Information     [employer] gives you secure access to your electronic health record. If you see a primary care provider, you can also send messages to your care team and make appointments. If you have questions, please call your primary care clinic.  If you do not have a primary care provider, please call [phone] and they will assist you.        Care EveryWhere ID     This is your Care EveryWhere ID. This could be used by other organizations to access your [place] medical records  [id number]        Your Vitals Were     Last Period                   05/02/2017            Blood Pressure from Last 3 Encounters:   11/09/17 116/74   10/12/17 112/60   09/05/17 122/64    Weight from Last 3 Encounters:   11/09/17 83.5 kg (184 lb)   10/12/17 82.6 kg (182 lb)   09/05/17 81.6 kg (180 lb)              Today, you had the following     No orders found for display       Primary Care Provider Office Phone # Fax #    Rafael Carrasco [phone] [address]  [place] [address]         Equal Access to Services     [facility] Center[facility]RISTINA : Hadii jose Borrego, scott benedict, kirtalina ag. So [facility] [phone].    ATENCIÓN: Si habla español, tiene a tang disposición servicios gratuitos de asistencia lingüística. Llame al [phone].    We comply with applicable federal civil rights laws and [place] laws. We do not discriminate on the basis of race, color, national origin, age, disability, sex, sexual orientation, or gender identity.            Thank you!     Thank you for choosing [facility]  for your care. Our goal is always to provide you with excellent care. Hearing back from our patients is one way we can continue to improve our services. Please take a few minutes to complete the written survey that you may receive in the mail after your visit with us. Thank you!             Your Updated Medication List - Protect others around you: Learn how to safely use, store and throw away your medicines at www.disposemymeds.org.      Notice  As of 11/15/2017 11:56 AM    You have not been prescribed any medications.

## 2017-11-15 NOTE — NURSING NOTE
PCC RN met with Gerald to introduce self, provide contact information, and plan for peds surgery consult.  PCC will go ahead and schedule peds surgery consult and notify Guerita with that appointment time.    Cande Chris RN

## 2017-11-22 ENCOUNTER — OFFICE VISIT (OUTPATIENT)
Dept: MATERNAL FETAL MEDICINE | Facility: CLINIC | Age: 27
End: 2017-11-22
Attending: OBSTETRICS & GYNECOLOGY
Payer: COMMERCIAL

## 2017-11-22 ENCOUNTER — HOSPITAL ENCOUNTER (OUTPATIENT)
Dept: ULTRASOUND IMAGING | Facility: CLINIC | Age: 27
Discharge: HOME | End: 2017-11-22
Attending: OBSTETRICS & GYNECOLOGY | Admitting: OBSTETRICS & GYNECOLOGY
Payer: COMMERCIAL

## 2017-11-22 DIAGNOSIS — O35.9XX0 SUSPECTED ABNORMALITY OF FETUS AFFECTING MANAGEMENT OF MOTHER IN SINGLETON PREGNANCY: ICD-10-CM

## 2017-11-22 DIAGNOSIS — O35.9XX0 KNOWN FETAL ANOMALY, ANTEPARTUM, SINGLE OR UNSPECIFIED FETUS: ICD-10-CM

## 2017-11-22 DIAGNOSIS — O35.CXX0 PREGNANCY COMPLICATED BY FETAL LUNG LESION, SINGLE OR UNSPECIFIED FETUS: Primary | ICD-10-CM

## 2017-11-22 PROCEDURE — 76816 OB US FOLLOW-UP PER FETUS: CPT

## 2017-11-22 NOTE — MR AVS SNAPSHOT
After Visit Summary   11/22/2017    Guerita SILVA    MRN: [id number]           Patient Information     Date Of Birth          1990        Visit Information        Provider Department      11/22/2017 10:00 AM Cande Joseph MD [facility] Maternal Fetal Medicine [facility]        Today's Diagnoses     Pregnancy complicated by fetal lung lesion, single or unspecified fetus    -  1    Known fetal anomaly, antepartum, single or unspecified fetus           Follow-ups after your visit        Your next 10 appointments already scheduled     Nov 30, 2017  9:30 AM CST   MFM US COMPRE SINGLE F/U with SHMFMUSR1   [facility] Maternal Fetal Medicine Ultrasound [facility] ([facility])    [address] [place] [address] [address]  [facility] [address]   [phone]           Wear comfortable clothes and leave your valuables at home.            Nov 30, 2017 10:00 AM CST   Radiology MD with  AFSHIN ROSE   [facility] Maternal Fetal Medicine [facility])    [address] [place] [address] [address]  [facility] [address] [phone]           Please arrive at the time given for your first appointment. This visit is used internally to schedule the physician's time during your ultrasound.            Dec 07, 2017  8:20 AM CST   ESTABLISHED PRENATAL with Rafael Carrasco MD   [facility] [place] ([facility])    [address]  [facility] [address]   [phone]            Jan 03, 2018  1:15 PM CST   New Patient Visit with Vaibhav Tapia MD   Peds Surgery ([facility])    [facility]  [address]  [address]  [facility] [address]   [phone]            Jan 03, 2018  2:15 PM CST   MFM US COMPRE SINGLE F/U with URMFMUSR1   [facility] Maternal Fetal Medicine Ultrasound - [place] ([facility])    [address]  [facility] [id number] [phone]            Wear comfortable clothes and leave your valuables at home.            Jan 03, 2018  2:45 PM CST   Radiology MD with UR AFSHIN ROSE   [facility] Maternal Fetal Medicine [facility] ([facility])    [address]  [facility] [place] [address]   [phone]           Please arrive at the time given for your first appointment. This visit is used internally to schedule the physician's time during your ultrasound.              Who to contact     If you have questions or need follow up information about today's clinic visit or your schedule please contact [facility] MATERNAL FETAL MEDICINE [facility] directly at [phone].  Normal or non-critical lab and imaging results will be communicated to you by FreeDrivehart, letter or phone within 4 business days after the clinic has received the results. If you do not hear from us within 7 days, please contact the clinic through Attractat or phone. If you have a critical or abnormal lab result, we will notify you by phone as soon as possible.  Submit refill requests through [employer] or call your pharmacy and they will forward the refill request to us. Please allow 3 business days for your refill to be completed.          Additional Information About Your Visit        FreeDriveharGamgee Information     [employer] gives you secure access to your electronic health record. If you see a primary care provider, you can also send messages to your care team and make appointments. If you have questions, please call your primary care clinic.  If you do not have a primary care provider, please call [phone] and they will assist you.        Care EveryWhere ID     This is your Care EveryWhere ID. This could be used by other organizations to access your [place] medical records  [id number]        Your Vitals Were     Last Period                   05/02/2017            Blood Pressure from Last 3 Encounters:   11/09/17 116/74   10/12/17 112/60   09/05/17 122/64    Weight from Last 3  Encounters:   11/09/17 83.5 kg (184 lb)   10/12/17 82.6 kg (182 lb)   09/05/17 81.6 kg (180 lb)              Today, you had the following     No orders found for display       Primary Care Provider Office Phone # Fax #    Rafael Carrasco [phone] [address] [facility] [address]  [place] [address]        Equal Access to Services     [facility]RISTINA : Hadii aad ku hadasho Soomaali, waaxda luqadaha, qaybta kaalmada adeegyada, waxay idiin hayaan adeeg hainolbertowalter lanoel . So [facility] [phone].    ATENCIÓN: Si habla español, tiene a tang disposición servicios gratuitos de asistencia lingüística. Llame al [phone].    We comply with applicable federal civil rights laws and [place] laws. We do not discriminate on the basis of race, color, national origin, age, disability, sex, sexual orientation, or gender identity.            Thank you!     Thank you for choosing [facility]  for your care. Our goal is always to provide you with excellent care. Hearing back from our patients is one way we can continue to improve our services. Please take a few minutes to complete the written survey that you may receive in the mail after your visit with us. Thank you!             Your Updated Medication List - Protect others around you: Learn how to safely use, store and throw away your medicines at www.disposemymeds.org.      Notice  As of 11/22/2017 12:11 PM    You have not been prescribed any medications.

## 2017-11-22 NOTE — PROGRESS NOTES
Please see ultrasound report under imaging tab for details on ultrasound performed today.    Cande Joseph MD  , OB/GYN  Maternal-Fetal Medicine  [email or web address]  [phone] (Academic office)  [phone] (Pager)

## 2017-11-30 ENCOUNTER — HOSPITAL ENCOUNTER (OUTPATIENT)
Dept: ULTRASOUND IMAGING | Facility: CLINIC | Age: 27
Discharge: HOME | End: 2017-11-30
Attending: OBSTETRICS & GYNECOLOGY | Admitting: OBSTETRICS & GYNECOLOGY
Payer: COMMERCIAL

## 2017-11-30 ENCOUNTER — OFFICE VISIT (OUTPATIENT)
Dept: MATERNAL FETAL MEDICINE | Facility: CLINIC | Age: 27
End: 2017-11-30
Attending: OBSTETRICS & GYNECOLOGY
Payer: COMMERCIAL

## 2017-11-30 DIAGNOSIS — O35.9XX0 SUSPECTED ABNORMALITY OF FETUS AFFECTING MANAGEMENT OF MOTHER IN SINGLETON PREGNANCY: Primary | ICD-10-CM

## 2017-11-30 DIAGNOSIS — O35.9XX0 SUSPECTED ABNORMALITY OF FETUS AFFECTING MANAGEMENT OF MOTHER IN SINGLETON PREGNANCY: ICD-10-CM

## 2017-11-30 DIAGNOSIS — Q33.0 CONGENITAL PULMONARY AIRWAY MALFORMATION (CPAM): ICD-10-CM

## 2017-11-30 PROCEDURE — 76816 OB US FOLLOW-UP PER FETUS: CPT

## 2017-11-30 NOTE — MR AVS SNAPSHOT
After Visit Summary   11/30/2017    Guerita SILVA    MRN: [id number]           Patient Information     Date Of Birth          1990        Visit Information        Provider Department      11/30/2017 10:00 AM Lazara Brumfield DO [facility] Maternal Fetal Medicine [facility]        Today's Diagnoses     Suspected abnormality of fetus affecting management of mother in [place] pregnancy    -  1    Congenital pulmonary airway malformation (CPAM)           Follow-ups after your visit        Your next 10 appointments already scheduled     Dec 06, 2017 11:45 AM CST   (Arrive by 11:30 AM)   MFM US COMPRE SINGLE F/U with URMFMUSR4   [facility] - [place] ([facility])    [address]  [facility] [address] [phone]           Wear comfortable clothes and leave your valuables at home.            Dec 06, 2017 12:15 PM CST   Radiology MD with HILLARY PEPE MD   [facility] Maternal Fetal Medicine - [place] ([facility])    [address]  [facility] [address]   [phone]           Please arrive at the time given for your first appointment. This visit is used internally to schedule the physician's time during your ultrasound.            Dec 07, 2017  8:20 AM CST   ESTABLISHED PRENATAL with Rafael Carrasco MD   [facility] [place] ([facility] [place])    [address]  [facility] [address]   [phone]            Dec 14, 2017  9:30 AM CST   (Arrive by 9:15 AM)   MFM US COMPRE SINGLE F/U with SHMFMUSR3   [facility] Maternal Fetal Medicine Ultrasound [facility] ([facility])    [address]  [facility] [address]   [phone]           Wear comfortable clothes and leave your valuables at home.            Dec 14, 2017 10:00 AM CST   Radiology MD with RADHA PEPE MD   [facility] Maternal Fetal Medicine   [facility] ([facility])    [address]  [address]  [facility] [address] [phone]           Please arrive at the time given for your first appointment. This visit is used internally to schedule the physician's time during your ultrasound.            Jan 03, 2018  1:15 PM CST   New Patient Visit with Vaibhav Tapia MD   Peds Surgery ([facility])    [facility]  [address]  [address]  [facility] [address] [phone]            Jan 03, 2018  2:15 PM CST   MFM US COMPRE SINGLE F/U with URMFMUSR1   [facility] - [facility])    [address]  [facility] [address] [phone]           Wear comfortable clothes and leave your valuables at home.            Jan 03, 2018  2:45 PM CST   Radiology MD with UR AFSHIN ROSE   [facility])    [address]  [facility] [address]   [phone]           Please arrive at the time given for your first appointment. This visit is used internally to schedule the physician's time during your ultrasound.              Future tests that were ordered for you today     Open Future Orders        Priority Expected Expires Ordered    MFM US Comprehensive Single F/U Routine 12/14/2017 11/30/2018 11/30/2017    MFM US Comprehensive Single F/U Routine 12/21/2017 11/30/2018 11/30/2017    MFM US Comprehensive Single F/U Routine 12/28/2017 11/30/2018 11/30/2017    MFM US Comprehensive Single F/U Routine 12/7/2017 11/30/2018 11/30/2017            Who to contact     If you have questions or need follow up information about today's clinic visit or your schedule please contact [facility] MATERNAL FETAL MEDICINE [facility] directly at [phone].  Normal or non-critical lab and imaging results will be communicated to you by MyChart, letter or phone within 4 business days  after the clinic has received the results. If you do not hear from us within 7 days, please contact the clinic through [employer] or phone. If you have a critical or abnormal lab result, we will notify you by phone as soon as possible.  Submit refill requests through [employer] or call your pharmacy and they will forward the refill request to us. Please allow 3 business days for your refill to be completed.          Additional Information About Your Visit        digitalboxharZipwhip Information     [employer] gives you secure access to your electronic health record. If you see a primary care provider, you can also send messages to your care team and make appointments. If you have questions, please call your primary care clinic.  If you do not have a primary care provider, please call [phone] and they will assist you.        Care EveryWhere ID     This is your Care EveryWhere ID. This could be used by other organizations to access your [place] medical records  [id number]        Your Vitals Were     Last Period                   05/02/2017            Blood Pressure from Last 3 Encounters:   11/09/17 116/74   10/12/17 112/60   09/05/17 122/64    Weight from Last 3 Encounters:   11/09/17 83.5 kg (184 lb)   10/12/17 82.6 kg (182 lb)   09/05/17 81.6 kg (180 lb)               Primary Care Provider Office Phone # Fax #    Rafael Carrasco [phone] [address] [facility] [address]  [facility] [address]        Equal Access to Services     [facility]KRISTINA AH: Hadii aad ku hadasho Soraali, waaxda luqadaha, qaybta kaalmada adeegyada, alina hylton . So [facility] [phone].    ATENCIÓN: Si habla español, tiene a tang disposición servicios gratuitos de asistencia lingüística. Llame al [phone].    We comply with applicable federal civil rights laws and [place] laws. We do not discriminate on the basis of race, color, national origin, age, disability, sex, sexual orientation, or gender identity.             Thank you!     Thank you for choosing [facility]  for your care. Our goal is always to provide you with excellent care. Hearing back from our patients is one way we can continue to improve our services. Please take a few minutes to complete the written survey that you may receive in the mail after your visit with us. Thank you!             Your Updated Medication List - Protect others around you: Learn how to safely use, store and throw away your medicines at www.disposemymeds.org.      Notice  As of 11/30/2017 12:14 PM    You have not been prescribed any medications.

## 2017-12-06 ENCOUNTER — OFFICE VISIT (OUTPATIENT)
Dept: MATERNAL FETAL MEDICINE | Facility: CLINIC | Age: 27
End: 2017-12-06
Attending: OBSTETRICS & GYNECOLOGY
Payer: COMMERCIAL

## 2017-12-06 ENCOUNTER — HOSPITAL ENCOUNTER (OUTPATIENT)
Dept: ULTRASOUND IMAGING | Facility: CLINIC | Age: 27
Discharge: HOME | End: 2017-12-06
Attending: OBSTETRICS & GYNECOLOGY | Admitting: OBSTETRICS & GYNECOLOGY
Payer: COMMERCIAL

## 2017-12-06 DIAGNOSIS — O35.9XX0 SUSPECTED ABNORMALITY OF FETUS AFFECTING MANAGEMENT OF MOTHER IN SINGLETON PREGNANCY: ICD-10-CM

## 2017-12-06 DIAGNOSIS — Q33.0 CONGENITAL PULMONARY AIRWAY MALFORMATION (CPAM): Primary | ICD-10-CM

## 2017-12-06 PROCEDURE — 76816 OB US FOLLOW-UP PER FETUS: CPT

## 2017-12-06 NOTE — NURSING NOTE
PCC met briefly with Guerita to review POC. Guerita will continue weekly u/s monitoring at [facility]. She will return to [place] 1/3/18 for Peds Surgery consult and ultrasound at [facility]. Map and directions given to family. Planning delivery at [facility]. No NICU/SW consults needed at this time.

## 2017-12-06 NOTE — MR AVS SNAPSHOT
After Visit Summary   12/6/2017    Guerita SILVA    MRN: [id number]           Patient Information     Date Of Birth          1990        Visit Information        Provider Department      12/6/2017 12:15 PM Lazara Brumfield DO [facility] [place]        Today's Diagnoses     Congenital pulmonary airway malformation (CPAM)    -  1    Suspected abnormality of fetus affecting management of mother in [place] pregnancy           Follow-ups after your visit        Your next 10 appointments already scheduled     Dec 14, 2017  8:20 AM CST   Glucose Tolerance Test with WE LAB   [facility] [place] ([facility])    [address]  [facility] [address]   [phone]            Dec 14, 2017  9:30 AM CST   (Arrive by 9:15 AM)   MFM US COMPRE SINGLE F/U with SHMFMUSR3   [facility] ([facility] [place])    [address] [place] [address]  [facility] [address] [phone]           Wear comfortable clothes and leave your valuables at home.            Dec 14, 2017 10:00 AM CST   Radiology MD with  MFM MD   [facility] ([facility] [place])    [address] [place] [address]  [facility] [address] [phone]           Please arrive at the time given for your first appointment. This visit is used internally to schedule the physician's time during your ultrasound.            Jan 03, 2018  1:15 PM CST   New Patient Visit with Vaibhav Tapia MD   Peds Surgery ([facility])    [facility]  [address]  [address]  [facility] [address] [phone]            Jan 03, 2018  2:15 PM CST   MFM US COMPRE SINGLE F/U with URMFMUSR1   [facility] [place] ([facility])    [address]  [facility] [id number] [phone]            Wear comfortable clothes and leave your valuables at home.            Jan 03, 2018  2:45 PM CST   Radiology MD with HILLARY PEPE MD   [facility] ([facility])    [address]  [facility] [place] [address]   [phone]           Please arrive at the time given for your first appointment. This visit is used internally to schedule the physician's time during your ultrasound.            Jan 04, 2018 10:00 AM CST   ESTABLISHED PRENATAL with Rafael Carrasco MD   [facility] [place] ([facility])    [address] [place] [address] [phone]              Who to contact     If you have questions or need follow up information about today's clinic visit or your schedule please contact [facility]ERNAL FETAL MEDIC[facility] directly at [phone].  Normal or non-critical lab and imaging results will be communicated to you by MyChart, letter or phone within 4 business days after the clinic has received the results. If you do not hear from us within 7 days, please contact the clinic through [employer] or phone. If you have a critical or abnormal lab result, we will notify you by phone as soon as possible.  Submit refill requests through [employer] or call your pharmacy and they will forward the refill request to us. Please allow 3 business days for your refill to be completed.          Additional Information About Your Visit        MyChart Information     [employer] gives you secure access to your electronic health record. If you see a primary care provider, you can also send messages to your care team and make appointments. If you have questions, please call your primary care clinic.  If you do not have a primary care provider, please call [phone] and they will assist you.        Care EveryWhere ID     This is your Care EveryWhere ID. This could be used by other organizations to access your  [place] medical records  [id number]        Your Vitals Were     Last Period                   05/02/2017            Blood Pressure from Last 3 Encounters:   12/07/17 122/76   11/09/17 116/74   10/12/17 112/60    Weight from Last 3 Encounters:   12/07/17 85.7 kg (189 lb)   11/09/17 83.5 kg (184 lb)   10/12/17 82.6 kg (182 lb)              Today, you had the following     No orders found for display       Primary Care Provider Office Phone # Fax #    Rafael Carrasco [phone] [address] [facility] [address]  [place] [address]        Equal Access to Services     [facility] : Hadii jose Borrego, wazhao benedict, qaeduardo kaalmada safia, alina carpenter. So [facility] [phone].    ATENCIÓN: Si habla español, tiene a tang disposición servicios gratuitos de asistencia lingüística. Llame al [phone].    We comply with applicable federal civil rights laws and [place] laws. We do not discriminate on the basis of race, color, national origin, age, disability, sex, sexual orientation, or gender identity.            Thank you!     Thank you for choosing [facility]  for your care. Our goal is always to provide you with excellent care. Hearing back from our patients is one way we can continue to improve our services. Please take a few minutes to complete the written survey that you may receive in the mail after your visit with us. Thank you!             Your Updated Medication List - Protect others around you: Learn how to safely use, store and throw away your medicines at www.disposemymeds.org.      Notice  As of 12/6/2017 11:59 PM    You have not been prescribed any medications.

## 2017-12-07 ENCOUNTER — PRENATAL OFFICE VISIT (OUTPATIENT)
Dept: OBGYN | Facility: CLINIC | Age: 27
End: 2017-12-07
Payer: COMMERCIAL

## 2017-12-07 VITALS — BODY MASS INDEX: 35.71 KG/M2 | DIASTOLIC BLOOD PRESSURE: 76 MMHG | SYSTOLIC BLOOD PRESSURE: 122 MMHG | WEIGHT: 189 LBS

## 2017-12-07 DIAGNOSIS — Z23 NEED FOR TDAP VACCINATION: ICD-10-CM

## 2017-12-07 DIAGNOSIS — Z34.02 ENCOUNTER FOR SUPERVISION OF NORMAL FIRST PREGNANCY IN SECOND TRIMESTER: ICD-10-CM

## 2017-12-07 DIAGNOSIS — Z3A.28 28 WEEKS GESTATION OF PREGNANCY: ICD-10-CM

## 2017-12-07 DIAGNOSIS — O35.CXX0 PREGNANCY COMPLICATED BY FETAL LUNG LESION, SINGLE OR UNSPECIFIED FETUS: Primary | ICD-10-CM

## 2017-12-07 LAB
GLUCOSE 1H P 50 G GLC PO SERPL-MCNC: 152 MG/DL (ref 60–129)
HGB BLD-MCNC: 11.5 G/DL (ref 11.7–15.7)

## 2017-12-07 PROCEDURE — 90715 TDAP VACCINE 7 YRS/> IM: CPT | Performed by: OBSTETRICS & GYNECOLOGY

## 2017-12-07 PROCEDURE — 90471 IMMUNIZATION ADMIN: CPT | Performed by: OBSTETRICS & GYNECOLOGY

## 2017-12-07 PROCEDURE — 36415 COLL VENOUS BLD VENIPUNCTURE: CPT | Performed by: OBSTETRICS & GYNECOLOGY

## 2017-12-07 PROCEDURE — 99207 ZZC PRENATAL VISIT: CPT | Performed by: OBSTETRICS & GYNECOLOGY

## 2017-12-07 PROCEDURE — 00000218 ZZHCL STATISTIC OBHBG - HEMOGLOBIN: Performed by: OBSTETRICS & GYNECOLOGY

## 2017-12-07 PROCEDURE — 82950 GLUCOSE TEST: CPT | Performed by: OBSTETRICS & GYNECOLOGY

## 2017-12-07 NOTE — PROGRESS NOTES
No problems. CAM stable on ultrasound. Being done weekly.  Baby active.   No other issues.  RTC 4 weeks.

## 2017-12-07 NOTE — PROGRESS NOTES
Syphilis is a sexually transmitted disease that can cause birth defects in the babies of untreated mothers. Every pregnant patient is tested for syphilis early in each pregnancy as part of the routine lab work. The [place] Department of [facility] has seen an increase in the rate of syphilis in [place]. The [facility] now recommends testing for syphilis 3 times during a pregnancy, the new prenatal visit, 28 weeks and when admitted for delivery. Patient declines lab testing for syphilis.

## 2017-12-07 NOTE — MR AVS SNAPSHOT
After Visit Summary   12/7/2017    Guerita SILVA    MRN: [id number]           Patient Information     Date Of Birth          1990        Visit Information        Provider Department      12/7/2017 8:20 AM Rafael Carrasco MD [facility] [place]        Today's Diagnoses     Pregnancy complicated by fetal lung lesion, single or unspecified fetus    -  1    Need for Tdap vaccination        28 weeks gestation of pregnancy        Encounter for supervision of normal first pregnancy in second trimester           Follow-ups after your visit        Your next 10 appointments already scheduled     Dec 14, 2017  8:20 AM CST   Glucose Tolerance Test with WE LAB   [facility] [place] ([facility] [place])    [address]  [facility] [address]   [phone]            Dec 14, 2017  9:30 AM CST   (Arrive by 9:15 AM)   MFM US COMPRE SINGLE F/U with SHMFMUSR3   [facility] ([facility])    [address] [place] [address]  [facility] [address] [phone]           Wear comfortable clothes and leave your valuables at home.            Dec 14, 2017 10:00 AM CST   Radiology MD with  AFSHIN ROSE   [facility] ([facility])    [address] [place] [address]  [facility] [address] [phone]           Please arrive at the time given for your first appointment. This visit is used internally to schedule the physician's time during your ultrasound.            Jan 03, 2018  1:15 PM CST   New Patient Visit with Vaibhav Tapia MD   Peds Surgery ([facility])    [facility]  [address]  [address] [facility] [address]   [phone]            Jan 03, 2018  2:15 PM CST   MFM US COMPRE SINGLE F/U with URMFMUSR1   [facility] [place] ([facility]  [place])    [address]  [facility] [address] [phone]           Wear comfortable clothes and leave your valuables at home.            Jan 03, 2018  2:45 PM LUIS ENRIQUE   Radiology MD with HILLARY PEPE MD   [facility] ([facility])    [address]  [facility] [address]   [phone]           Please arrive at the time given for your first appointment. This visit is used internally to schedule the physician's time during your ultrasound.            Jan 04, 2018 10:00 AM LUIS ENRIQUE   ESTABLISHED PRENATAL with Rafael Carrasco MD   [facility] [place] ([facility] [place])    [address] [place] [address]  [facility] [address] [phone]              Who to contact     If you have questions or need follow up information about today's clinic visit or your schedule please contact [facility] [place] directly at [phone].  Normal or non-critical lab and imaging results will be communicated to you by [employer], letter or phone within 4 business days after the clinic has received the results. If you do not hear from us within 7 days, please contact the clinic through [employer] or phone. If you have a critical or abnormal lab result, we will notify you by phone as soon as possible.  Submit refill requests through [employer] or call your pharmacy and they will forward the refill request to us. Please allow 3 business days for your refill to be completed.          Additional Information About Your Visit        [employer] Information     [employer] gives you secure access to your electronic health record. If you see a primary care provider, you can also send messages to your care team and make appointments. If you have questions, please call your primary care clinic.  If you do not have a primary care provider, please call [phone] and they will assist you.        Care EveryWhere ID     This is your Care EveryWhere ID.  This could be used by other organizations to access your [place] medical records  [id number]        Your Vitals Were     Last Period Breastfeeding? BMI (Body Mass Index)             05/02/2017 No 35.71 kg/m2          Blood Pressure from Last 3 Encounters:   12/07/17 122/76   11/09/17 116/74   10/12/17 112/60    Weight from Last 3 Encounters:   12/07/17 189 lb (85.7 kg)   11/09/17 184 lb (83.5 kg)   10/12/17 182 lb (82.6 kg)              We Performed the Following     Glucose tolerance gest screen 1 hour     OB hemoglobin     TDAP VACCINE (ADACEL)     VACCINE ADMINISTRATION, INITIAL        Primary Care Provider Office Phone # Fax #    Rafael Carrasco [phone] [address] [address]        Equal Access to Services     JUANITO PEDRAZA : Hadii jose mccoy Sochelita, waaxda luqadaha, qaybta kaalmada safia, alina hylton . So [facility] [phone].    ATENCIÓN: Si habla español, tiene a tang disposición servicios gratuitos de asistencia lingüística. Emmy al [phone].    We comply with applicable federal civil rights laws and [place] laws. We do not discriminate on the basis of race, color, national origin, age, disability, sex, sexual orientation, or gender identity.            Thank you!     Thank you for choosing [facility]  for your care. Our goal is always to provide you with excellent care. Hearing back from our patients is one way we can continue to improve our services. Please take a few minutes to complete the written survey that you may receive in the mail after your visit with us. Thank you!             Your Updated Medication List - Protect others around you: Learn how to safely use, store and throw away your medicines at www.disposemymeds.org.      Notice  As of 12/7/2017 11:59 PM    You have not been prescribed any medications.

## 2017-12-14 ENCOUNTER — OFFICE VISIT (OUTPATIENT)
Dept: MATERNAL FETAL MEDICINE | Facility: CLINIC | Age: 27
End: 2017-12-14
Attending: OBSTETRICS & GYNECOLOGY
Payer: COMMERCIAL

## 2017-12-14 ENCOUNTER — HOSPITAL ENCOUNTER (OUTPATIENT)
Dept: ULTRASOUND IMAGING | Facility: CLINIC | Age: 27
Discharge: HOME | End: 2017-12-14
Attending: OBSTETRICS & GYNECOLOGY | Admitting: OBSTETRICS & GYNECOLOGY
Payer: COMMERCIAL

## 2017-12-14 DIAGNOSIS — Q33.0 CONGENITAL PULMONARY AIRWAY MALFORMATION (CPAM): Primary | ICD-10-CM

## 2017-12-14 DIAGNOSIS — R73.09 IMPAIRED GLUCOSE TOLERANCE TEST: Primary | ICD-10-CM

## 2017-12-14 DIAGNOSIS — O35.9XX0 SUSPECTED ABNORMALITY OF FETUS AFFECTING MANAGEMENT OF MOTHER IN SINGLETON PREGNANCY: ICD-10-CM

## 2017-12-14 PROCEDURE — 76816 OB US FOLLOW-UP PER FETUS: CPT

## 2017-12-14 PROCEDURE — 82951 GLUCOSE TOLERANCE TEST (GTT): CPT | Performed by: OBSTETRICS & GYNECOLOGY

## 2017-12-14 PROCEDURE — 82952 GTT-ADDED SAMPLES: CPT | Performed by: OBSTETRICS & GYNECOLOGY

## 2017-12-14 PROCEDURE — 36415 COLL VENOUS BLD VENIPUNCTURE: CPT | Performed by: OBSTETRICS & GYNECOLOGY

## 2017-12-14 NOTE — MR AVS SNAPSHOT
After Visit Summary   12/14/2017    Guerita SILVA    MRN: [id number]           Patient Information     Date Of Birth          1990        Visit Information        Provider Department      12/14/2017 10:00 AM Lazara Brumfield DO [facility]etal [facility]        Today's Diagnoses     Congenital pulmonary airway malformation (CPAM)    -  1       Follow-ups after your visit        Your next 10 appointments already scheduled     Dec 21, 2017 10:15 AM CST   (Arrive by 10:00 AM)   MFM US COMPRE SINGLE F/U with SHMFMUSR2   [facility] ([facility])    [address] [place] [address]  [facility] [address]   [phone]           Wear comfortable clothes and leave your valuables at home.            Dec 21, 2017 10:45 AM CST   Radiology MD with SH AFSHIN ROSE   [facility] Fetal Medicine [facility])    [address] [place] [address]  [facility] [address]   [phone]           Please arrive at the time given for your first appointment. This visit is used internally to schedule the physician's time during your ultrasound.            Jan 03, 2018  1:15 PM CST   New Patient Visit with Vaibhav Tapia MD   Peds Surgery ([facility])    [facility]  [address]  [address]  [facility] [address]   [phone]            Jan 03, 2018  2:15 PM CST   MFM US COMPRE SINGLE F/U with URMFMUSR1   [facility] [place] ([facility])    [address]  [facility] [address]   [phone]           Wear comfortable clothes and leave your valuables at home.            Jan 03, 2018  2:45 PM CST   Radiology MD with UR AFSHIN ROSE   [facility] [place] ([facility])    [address]  [facility] [address]   [phone]           Please  arrive at the time given for your first appointment. This visit is used internally to schedule the physician's time during your ultrasound.            Jan 04, 2018 10:00 AM CST   ESTABLISHED PRENATAL with Rafael Carrasco MD   [facility] ([facility])    [address] [place] [address] [phone]              Future tests that were ordered for you today     Open Future Orders        Priority Expected Expires Ordered    MFM US Comprehensive Single F/U Routine 12/21/2017 12/14/2018 12/14/2017            Who to contact     If you have questions or need follow up information about today's clinic visit or your schedule please contact [facility] FETAL MEDIC[facility] directly at [phone].  Normal or non-critical lab and imaging results will be communicated to you by MyChart, letter or phone within 4 business days after the clinic has received the results. If you do not hear from us within 7 days, please contact the clinic through MyChart or phone. If you have a critical or abnormal lab result, we will notify you by phone as soon as possible.  Submit refill requests through [employer] or call your pharmacy and they will forward the refill request to us. Please allow 3 business days for your refill to be completed.          Additional Information About Your Visit        [employer] Information     [employer] gives you secure access to your electronic health record. If you see a primary care provider, you can also send messages to your care team and make appointments. If you have questions, please call your primary care clinic.  If you do not have a primary care provider, please call [phone] and they will assist you.        Care EveryWhere ID     This is your Care EveryWhere ID. This could be used by other organizations to access your [place] medical records  [id number]        Your Vitals Were     Last Period                   05/02/2017             Blood Pressure from Last 3 Encounters:   12/07/17 122/76   11/09/17 116/74   10/12/17 112/60    Weight from Last 3 Encounters:   12/07/17 85.7 kg (189 lb)   11/09/17 83.5 kg (184 lb)   10/12/17 82.6 kg (182 lb)               Primary Care Provider Office Phone # Fax #    Rafael Carrasco [phone] [address]  [facility] [address]        Equal Access to Services     [facility]: Hadii aad ku hadasho Soomaali, waaxda luqadaha, qaybta kaalmada adeegyada, alina de la fuente hayhoang hylton . So [facility] [phone].    ATENCIÓN: Si habla español, tiene a tang disposición servicios gratuitos de asistencia lingüística. Llame al [phone].    We comply with applicable federal civil rights laws and [place] laws. We do not discriminate on the basis of race, color, national origin, age, disability, sex, sexual orientation, or gender identity.            Thank you!     Thank you for choosing [facility]  for your care. Our goal is always to provide you with excellent care. Hearing back from our patients is one way we can continue to improve our services. Please take a few minutes to complete the written survey that you may receive in the mail after your visit with us. Thank you!             Your Updated Medication List - Protect others around you: Learn how to safely use, store and throw away your medicines at www.disposemymeds.org.      Notice  As of 12/14/2017 10:05 AM    You have not been prescribed any medications.

## 2017-12-15 LAB
GLUCOSE 1H P 100 G GLC PO SERPL-MCNC: 116 MG/DL (ref 60–179)
GLUCOSE 2H P 100 G GLC PO SERPL-MCNC: 93 MG/DL (ref 60–154)
GLUCOSE 3H P 100 G GLC PO SERPL-MCNC: 85 MG/DL (ref 60–139)
GLUCOSE P FAST SERPL-MCNC: 72 MG/DL (ref 60–94)

## 2017-12-21 ENCOUNTER — OFFICE VISIT (OUTPATIENT)
Dept: MATERNAL FETAL MEDICINE | Facility: CLINIC | Age: 27
End: 2017-12-21
Attending: OBSTETRICS & GYNECOLOGY
Payer: COMMERCIAL

## 2017-12-21 ENCOUNTER — HOSPITAL ENCOUNTER (OUTPATIENT)
Dept: ULTRASOUND IMAGING | Facility: CLINIC | Age: 27
Discharge: HOME | End: 2017-12-21
Attending: OBSTETRICS & GYNECOLOGY | Admitting: OBSTETRICS & GYNECOLOGY
Payer: COMMERCIAL

## 2017-12-21 DIAGNOSIS — Q33.0 CONGENITAL PULMONARY AIRWAY MALFORMATION (CPAM): Primary | ICD-10-CM

## 2017-12-21 DIAGNOSIS — Q33.0 CONGENITAL PULMONARY AIRWAY MALFORMATION (CPAM): ICD-10-CM

## 2017-12-21 PROCEDURE — 76816 OB US FOLLOW-UP PER FETUS: CPT

## 2017-12-21 NOTE — MR AVS SNAPSHOT
After Visit Summary   12/21/2017    Guerita SILVA    MRN: [id number]           Patient Information     Date Of Birth          1990        Visit Information        Provider Department      12/21/2017 10:45 AM Lazara Brumfield DO [facility]        Today's Diagnoses     Congenital pulmonary airway malformation (CPAM)    -  1       Follow-ups after your visit        Your next 10 appointments already scheduled     Jan 03, 2018  1:15 PM CST   New Patient Visit with Vaibhav Tapia MD   Peds Surgery ([facility])    [facility]  [address]  [address]  [facility] [address]   [phone]            Jan 03, 2018  2:15 PM CST   MFM US COMPRE SINGLE F/U with URMFMUSR1   [facility] M[facility] Fetal Medicine Ultrasound - [place] ([facility])    [address]  [facility] [address] [phone]           Wear comfortable clothes and leave your valuables at home.            Jan 03, 2018  2:45 PM CST   Radiology MD with UR AFSHIN ROSE   [facility] Maternal Fetal Medicine - [facility])    [address]  [facility] [address]   [phone]           Please arrive at the time given for your first appointment. This visit is used internally to schedule the physician's time during your ultrasound.            Jan 04, 2018 10:00 AM CST   ESTABLISHED PRENATAL with Rafael Carrasco MD   [facility] ([facility]    [address] [place] [address] [phone]              Who to contact     If you have questions or need follow up information about today's clinic visit or your schedule please contact [facility] MATERNAL FETAL MEDIC[facility] directly at [phone].  Normal or non-critical lab and imaging results will be communicated to you by MyChart, letter or phone within 4 business days  after the clinic has received the results. If you do not hear from us within 7 days, please contact the clinic through [employer] or phone. If you have a critical or abnormal lab result, we will notify you by phone as soon as possible.  Submit refill requests through [employer] or call your pharmacy and they will forward the refill request to us. Please allow 3 business days for your refill to be completed.          Additional Information About Your Visit        [employer] Information     [employer] gives you secure access to your electronic health record. If you see a primary care provider, you can also send messages to your care team and make appointments. If you have questions, please call your primary care clinic.  If you do not have a primary care provider, please call [phone] and they will assist you.        Care EveryWhere ID     This is your Care EveryWhere ID. This could be used by other organizations to access your [place] medical records  [id number]        Your Vitals Were     Last Period                   05/02/2017            Blood Pressure from Last 3 Encounters:   12/07/17 122/76   11/09/17 116/74   10/12/17 112/60    Weight from Last 3 Encounters:   12/07/17 85.7 kg (189 lb)   11/09/17 83.5 kg (184 lb)   10/12/17 82.6 kg (182 lb)              Today, you had the following     No orders found for display       Primary Care Provider Office Phone # Fax #    Rafael Marcin Carrasco [phone] [address] [facility] [facility] [address]  [place] [address]        Equal Access to Services     [facility]: Hadii aad ku hadasho Soomaali, waaxda luqadaha, qaybta kaalmada adeegyada, alina hylton . So [facility] [phone].    ATENCIÓN: Si habla español, tiene a tang disposición servicios gratuitos de asistencia lingüística. Llame al [phone].    We comply with applicable federal civil rights laws and [place] laws. We do not discriminate on the basis of race, color, national origin, age,  disability, sex, sexual orientation, or gender identity.            Thank you!     Thank you for choosing [facility]  for your care. Our goal is always to provide you with excellent care. Hearing back from our patients is one way we can continue to improve our services. Please take a few minutes to complete the written survey that you may receive in the mail after your visit with us. Thank you!             Your Updated Medication List - Protect others around you: Learn how to safely use, store and throw away your medicines at www.disposemymeds.org.      Notice  As of 12/21/2017  1:39 PM    You have not been prescribed any medications.

## 2018-01-03 ENCOUNTER — OFFICE VISIT (OUTPATIENT)
Dept: SURGERY | Facility: CLINIC | Age: 28
End: 2018-01-03
Attending: SURGERY
Payer: COMMERCIAL

## 2018-01-03 ENCOUNTER — HOSPITAL ENCOUNTER (OUTPATIENT)
Dept: ULTRASOUND IMAGING | Facility: CLINIC | Age: 28
Discharge: HOME | End: 2018-01-03
Attending: OBSTETRICS & GYNECOLOGY | Admitting: OBSTETRICS & GYNECOLOGY
Payer: COMMERCIAL

## 2018-01-03 ENCOUNTER — OFFICE VISIT (OUTPATIENT)
Dept: MATERNAL FETAL MEDICINE | Facility: CLINIC | Age: 28
End: 2018-01-03
Attending: OBSTETRICS & GYNECOLOGY
Payer: COMMERCIAL

## 2018-01-03 DIAGNOSIS — Q33.0 CONGENITAL PULMONARY AIRWAY MALFORMATION (CPAM): Primary | ICD-10-CM

## 2018-01-03 DIAGNOSIS — Z71.89 PRENATAL CONSULT: Primary | ICD-10-CM

## 2018-01-03 DIAGNOSIS — O35.CXX0 PREGNANCY COMPLICATED BY FETAL LUNG LESION, SINGLE OR UNSPECIFIED FETUS: ICD-10-CM

## 2018-01-03 PROCEDURE — 99201 ZZC OFFICE/OUTPT VISIT, NEW, LEVEL I: CPT | Mod: ZP | Performed by: SURGERY

## 2018-01-03 PROCEDURE — 76816 OB US FOLLOW-UP PER FETUS: CPT

## 2018-01-03 PROCEDURE — 76820 UMBILICAL ARTERY ECHO: CPT | Performed by: OBSTETRICS & GYNECOLOGY

## 2018-01-03 PROCEDURE — G0463 HOSPITAL OUTPT CLINIC VISIT: HCPCS | Mod: ZF

## 2018-01-03 NOTE — LETTER
1/3/2018      RE: Guerita SILVA  [address] [place] [address]       January 10, 2018.      Lazara Brumfield, DO   [facility]   [address]   [place]  [address]      RE: Guerita Silva   MRN: [id number]   : 1990      Dear Dr. Brumfield:      It was my pleasure to see Guerita Silva in clinic today regarding her fetus with a right-sided CPAM.      We discussed this diagnosis including the possibility of involution, the likelihood that it will persist given her child's age and size of the CPAM.  We discussed my recommendation that the child be assessed immediately in the postnasal period for any breathing difficulties.  If he has none, we can follow up with him in 3 months, at which time we will get a CT scan to verify the presence of the CPAM and assess the anatomy and consider surgical options.  I answered her questions and in all, we spent greater than 10 minutes together.      Thank you very much for allowing me to be involved in Ms. Silva's care.  Please contact me if I can be of further assistance.      Sincerely         Vaibhav Tapia MD

## 2018-01-03 NOTE — MR AVS SNAPSHOT
After Visit Summary   1/3/2018    Guerita SILVA    MRN: [id number]           Patient Information     Date Of Birth          1990        Visit Information        Provider Department      1/3/2018 1:15 PM Vaibhav Tapia MD Peds Surgery [facility] PEDIATRIC GENERAL SURGERY      Today's Diagnoses     Prenatal consult    -  1       Follow-ups after your visit        Your next 10 appointments already scheduled     Jan 18, 2018 10:00 AM CST   ESTABLISHED PRENATAL with Rafael Carrasco MD   [facility] [place] ([facility] [place])    [address]  [facility] [address]   [phone]            Jan 18, 2018 10:15 AM CST   (Arrive by 10:00 AM)   MFM US COMPRE SINGLE F/U with [id number]   [facility])    [address] [place] [address] [address]  [facility] [address]   [phone]           Wear comfortable clothes and leave your valuables at home.            Jan 18, 2018 10:45 AM CST   Radiology MD with RADHA PEPE MD   [facility] ([facility])    [address] [place] [address]   [phone]           Please arrive at the time given for your first appointment. This visit is used internally to schedule the physician's time during your ultrasound.            Feb 01, 2018 11:00 AM CST   (Arrive by 10:45 AM)   MFM US COMPRE SINGLE F/U with SHMFMUSR1   [facility] ([facility])    [address] [place] [address] [address]  [facility] [address]   [phone]           Wear comfortable clothes and leave your valuables at home.            Feb 01, 2018 11:30 AM CST   Radiology MD with RADHA PEPE MD   [facility] ([facility])    [address] [address]   [phone]           Please arrive at the time  given for your first appointment. This visit is used internally to schedule the physician's time during your ultrasound.              Who to contact     Please call your clinic at [phone] to:    Ask questions about your health    Make or cancel appointments    Discuss your medicines    Learn about your test results    Speak to your doctor   If you have compliments or concerns about an experience at your clinic, or if you wish to file a complaint, please contact [facility] Patient Relations at [phone] or email us at [email or web address]         Additional Information About Your Visit        MyChart Information     emids gives you secure access to your electronic health record. If you see a primary care provider, you can also send messages to your care team and make appointments. If you have questions, please call your primary care clinic.  If you do not have a primary care provider, please call [phone] and they will assist you.      emids is an electronic gateway that provides easy, online access to your medical records. With emids, you can request a clinic appointment, read your test results, renew a prescription or communicate with your care team.     To access your existing account, please contact your [facility] or call [phone] for assistance.        Care EveryWhere ID     This is your Care EveryWhere ID. This could be used by other organizations to access your [place] medical records  [id number]        Your Vitals Were     Last Period                   05/02/2017            Blood Pressure from Last 3 Encounters:   No data found for BP    Weight from Last 3 Encounters:   No data found for Wt              Today, you had the following     No orders found for display       Primary Care Provider Office Phone # Fax #    Rafael Carrasco [phone] [address] [facility] [address]  [facility] [address]         Equal Access to Services     [facility] : Hadii jose Borrego, scott benedict, kirtalina ag. So [facility] [phone].    ATENCIÓN: Si habla español, tiene a tang disposición servicios gratuitos de asistencia lingüística. Llame al [phone].    We comply with applicable federal civil rights laws and [place] laws. We do not discriminate on the basis of race, color, national origin, age, disability, sex, sexual orientation, or gender identity.            Thank you!     Thank you for choosing PEDS SURGERY  for your care. Our goal is always to provide you with excellent care. Hearing back from our patients is one way we can continue to improve our services. Please take a few minutes to complete the written survey that you may receive in the mail after your visit with us. Thank you!             Your Updated Medication List - Protect others around you: Learn how to safely use, store and throw away your medicines at www.disposemymeds.org.      Notice  As of 1/3/2018 11:59 PM    You have not been prescribed any medications.

## 2018-01-03 NOTE — MR AVS SNAPSHOT
After Visit Summary   1/3/2018    Gureita SILVA    MRN: [id number]           Patient Information     Date Of Birth          1990        Visit Information        Provider Department      1/3/2018 2:45 PM Lazara Brumfield DO [facility] Maternal Fetal Medicine [facility]        Today's Diagnoses     Congenital pulmonary airway malformation (CPAM)    -  1       Follow-ups after your visit        Your next 10 appointments already scheduled     Jan 04, 2018  9:00 AM CST   (Arrive by 8:45 AM)   MFM BPP SINGLE with [id number]   [facility] Maternal Fetal Medicine Ultrasound - [facility] ([facility])    [address] [place] [address] [address]  [facility] [address]   [phone]            Jan 04, 2018  9:30 AM CST   Radiology MD with  AFSHIN ROSE   [facility] Maternal Fetal Medicine [facility] ([facility])    [address] [place] [address] [address]  [facility] [address]   [phone]           Please arrive at the time given for your first appointment. This visit is used internally to schedule the physician's time during your ultrasound.            Jan 04, 2018 10:00 AM CST   ESTABLISHED PRENATAL with Rafael Carrasco MD   [facility] ([facility]    [address]  [place] [address]   [phone]              Future tests that were ordered for you today     Open Future Orders        Priority Expected Expires Ordered    MFM BPP Single Routine 1/4/2018 11/3/2018 1/3/2018    MFM US Comprehensive Single F/U Routine  1/3/2019 1/3/2018    MFM US Comprehensive Single F/U Routine  1/3/2019 1/3/2018    MFM US Comprehensive Single F/U Routine  1/3/2019 1/3/2018    MFM US Comprehensive Single F/U Routine  1/3/2019 1/3/2018            Who to contact     If you have questions or need follow up information about today's clinic visit or your schedule please contact [facility] MATERNAL FETAL MEDICINE [facility] directly at  [phone].  Normal or non-critical lab and imaging results will be communicated to you by MyChart, letter or phone within 4 business days after the clinic has received the results. If you do not hear from us within 7 days, please contact the clinic through IntelePeerhart or phone. If you have a critical or abnormal lab result, we will notify you by phone as soon as possible.  Submit refill requests through Smarterer or call your pharmacy and they will forward the refill request to us. Please allow 3 business days for your refill to be completed.          Additional Information About Your Visit        IntelePeerhart Information     Smarterer gives you secure access to your electronic health record. If you see a primary care provider, you can also send messages to your care team and make appointments. If you have questions, please call your primary care clinic.  If you do not have a primary care provider, please call [phone] and they will assist you.        Care EveryWhere ID     This is your Care EveryWhere ID. This could be used by other organizations to access your [place] medical records  [id number]        Your Vitals Were     Last Period                   05/02/2017            Blood Pressure from Last 3 Encounters:   12/07/17 122/76   11/09/17 116/74   10/12/17 112/60    Weight from Last 3 Encounters:   12/07/17 85.7 kg (189 lb)   11/09/17 83.5 kg (184 lb)   10/12/17 82.6 kg (182 lb)               Primary Care Provider Office Phone # Fax #    Rafael Marcin Carrasco [phone] [address] [facility] [address] [facility] [place] [address] [address]        Equal Access to Services     [facility]: Hadii aad ku hadasho Soomaali, waaxda luqadaha, qaybta kaalmada adeegnayely, alina hylton . So [facility] [phone].    ATENCIÓN: Si habla español, tiene a tang disposición servicios gratuitos de asistencia lingüística. Llame al [phone].    We comply with applicable federal civil rights laws and [place]  laws. We do not discriminate on the basis of race, color, national origin, age, disability, sex, sexual orientation, or gender identity.            Thank you!     Thank you for choosing [facility]  for your care. Our goal is always to provide you with excellent care. Hearing back from our patients is one way we can continue to improve our services. Please take a few minutes to complete the written survey that you may receive in the mail after your visit with us. Thank you!             Your Updated Medication List - Protect others around you: Learn how to safely use, store and throw away your medicines at www.disposemymeds.org.      Notice  As of 1/3/2018  5:13 PM    You have not been prescribed any medications.

## 2018-01-04 ENCOUNTER — OFFICE VISIT (OUTPATIENT)
Dept: MATERNAL FETAL MEDICINE | Facility: CLINIC | Age: 28
End: 2018-01-04
Attending: OBSTETRICS & GYNECOLOGY
Payer: COMMERCIAL

## 2018-01-04 ENCOUNTER — HOSPITAL ENCOUNTER (OUTPATIENT)
Dept: ULTRASOUND IMAGING | Facility: CLINIC | Age: 28
Discharge: HOME | End: 2018-01-04
Attending: OBSTETRICS & GYNECOLOGY | Admitting: OBSTETRICS & GYNECOLOGY
Payer: COMMERCIAL

## 2018-01-04 ENCOUNTER — PRENATAL OFFICE VISIT (OUTPATIENT)
Dept: OBGYN | Facility: CLINIC | Age: 28
End: 2018-01-04
Payer: COMMERCIAL

## 2018-01-04 VITALS — DIASTOLIC BLOOD PRESSURE: 70 MMHG | BODY MASS INDEX: 35.9 KG/M2 | WEIGHT: 190 LBS | SYSTOLIC BLOOD PRESSURE: 128 MMHG

## 2018-01-04 DIAGNOSIS — Z34.02 ENCOUNTER FOR SUPERVISION OF NORMAL FIRST PREGNANCY IN SECOND TRIMESTER: ICD-10-CM

## 2018-01-04 DIAGNOSIS — O35.CXX0 PREGNANCY COMPLICATED BY FETAL LUNG LESION, SINGLE OR UNSPECIFIED FETUS: Primary | ICD-10-CM

## 2018-01-04 DIAGNOSIS — Q33.0 CONGENITAL PULMONARY AIRWAY MALFORMATION (CPAM): ICD-10-CM

## 2018-01-04 DIAGNOSIS — Q33.0 CONGENITAL PULMONARY AIRWAY MALFORMATION (CPAM): Primary | ICD-10-CM

## 2018-01-04 PROCEDURE — 76819 FETAL BIOPHYS PROFIL W/O NST: CPT

## 2018-01-04 PROCEDURE — 99207 ZZC PRENATAL VISIT: CPT | Performed by: OBSTETRICS & GYNECOLOGY

## 2018-01-04 NOTE — MR AVS SNAPSHOT
After Visit Summary   1/4/2018    Guerita SILVA    MRN: [id number]           Patient Information     Date Of Birth          1990        Visit Information        Provider Department      1/4/2018 9:30 AM Lazara Brumfield DO [facility] Maternal Fetal Medicine [facility]        Today's Diagnoses     Congenital pulmonary airway malformation (CPAM)    -  1       Follow-ups after your visit        Your next 10 appointments already scheduled     Jan 18, 2018 10:00 AM CST   ESTABLISHED PRENATAL with Rafael Carrasco MD   [facility] [place] ([facility] [place])    [address] [address]   [phone]              Future tests that were ordered for you today     Open Future Orders        Priority Expected Expires Ordered    MFM BPP Single Routine 1/4/2018 11/3/2018 1/3/2018    MFM US Comprehensive Single F/U Routine  1/3/2019 1/3/2018    MFM US Comprehensive Single F/U Routine  1/3/2019 1/3/2018    MFM US Comprehensive Single F/U Routine  1/3/2019 1/3/2018    MFM US Comprehensive Single F/U Routine  1/3/2019 1/3/2018            Who to contact     If you have questions or need follow up information about today's clinic visit or your schedule please contact [facility] MATERNAL FETAL MEDICINE [facility] directly at [phone].  Normal or non-critical lab and imaging results will be communicated to you by MyChart, letter or phone within 4 business days after the clinic has received the results. If you do not hear from us within 7 days, please contact the clinic through MyChart or phone. If you have a critical or abnormal lab result, we will notify you by phone as soon as possible.  Submit refill requests through [employer] or call your pharmacy and they will forward the refill request to us. Please allow 3 business days for your refill to be completed.          Additional Information About Your Visit        MyChart Information     AlterGeot  gives you secure access to your electronic health record. If you see a primary care provider, you can also send messages to your care team and make appointments. If you have questions, please call your primary care clinic.  If you do not have a primary care provider, please call [phone] and they will assist you.        Care EveryWhere ID     This is your Care EveryWhere ID. This could be used by other organizations to access your [place] medical records  [id number]        Your Vitals Were     Last Period                   05/02/2017            Blood Pressure from Last 3 Encounters:   01/04/18 128/70   12/07/17 122/76   11/09/17 116/74    Weight from Last 3 Encounters:   01/04/18 86.2 kg (190 lb)   12/07/17 85.7 kg (189 lb)   11/09/17 83.5 kg (184 lb)              Today, you had the following     No orders found for display       Primary Care Provider Office Phone # Fax #    Rafael Carrasco [phone] [address] [facility] [address]  [facility] [address]        Equal Access to Services     [facility]: Hadii aad ku hadasho Soomaali, waaxda luqadaha, qaybta kaalmada adeegyada, alina hylton . So [facility] [phone].    ATENCIÓN: Si habla español, tiene a tang disposición servicios gratuitos de asistencia lingüística. Llame al [phone].    We comply with applicable federal civil rights laws and [place] laws. We do not discriminate on the basis of race, color, national origin, age, disability, sex, sexual orientation, or gender identity.            Thank you!     Thank you for choosing [facility]  for your care. Our goal is always to provide you with excellent care. Hearing back from our patients is one way we can continue to improve our services. Please take a few minutes to complete the written survey that you may receive in the mail after your visit with us. Thank you!             Your Updated Medication List - Protect others  around you: Learn how to safely use, store and throw away your medicines at www.disposemymeds.org.      Notice  As of 1/4/2018 10:30 PM    You have not been prescribed any medications.

## 2018-01-04 NOTE — PROGRESS NOTES
Still getting u/s to follow CPAM lesion. Had low fluid yesterday. Rpt today was normal.  Normal growth.   Met with Peds surgery. Ok for  at [facility]. Plan surgery at 3 months of age.  Will inquire if induction necessary by 40 weeks.  RTC in 2 weeks after next u/s.

## 2018-01-04 NOTE — MR AVS SNAPSHOT
After Visit Summary   1/4/2018    Guerita SILVA    MRN: [id number]           Patient Information     Date Of Birth          1990        Visit Information        Provider Department      1/4/2018 10:00 AM Rafael Carrasco MD [facility]        Today's Diagnoses     Pregnancy complicated by fetal lung lesion, single or unspecified fetus    -  1    Encounter for supervision of normal first pregnancy in second trimester           Follow-ups after your visit        Your next 10 appointments already scheduled     Jan 18, 2018 10:00 AM CST   ESTABLISHED PRENATAL with Rafael Carrasco MD   [facility] ([facility])    [address]  [facility] [address]   [phone]              Future tests that were ordered for you today     Open Future Orders        Priority Expected Expires Ordered    MFM BPP Single Routine 1/4/2018 11/3/2018 1/3/2018    MFM US Comprehensive Single F/U Routine  1/3/2019 1/3/2018    MFM US Comprehensive Single F/U Routine  1/3/2019 1/3/2018    MF US Comprehensive Single F/U Routine  1/3/2019 1/3/2018    [facility] US Comprehensive Single F/U Routine  1/3/2019 1/3/2018            Who to contact     If you have questions or need follow up information about today's clinic visit or your schedule please contact [facility] [place] directly at [phone].  Normal or non-critical lab and imaging results will be communicated to you by MyChart, letter or phone within 4 business days after the clinic has received the results. If you do not hear from us within 7 days, please contact the clinic through MyChart or phone. If you have a critical or abnormal lab result, we will notify you by phone as soon as possible.  Submit refill requests through [employer] or call your pharmacy and they will forward the refill request to us. Please allow 3 business days for your refill to be completed.           Additional Information About Your Visit        MyChart Information     Lockitron gives you secure access to your electronic health record. If you see a primary care provider, you can also send messages to your care team and make appointments. If you have questions, please call your primary care clinic.  If you do not have a primary care provider, please call [phone] and they will assist you.        Care EveryWhere ID     This is your Care EveryWhere ID. This could be used by other organizations to access your [place] medical records  [id number]        Your Vitals Were     Last Period Breastfeeding? BMI (Body Mass Index)             05/02/2017 No 35.9 kg/m2          Blood Pressure from Last 3 Encounters:   01/04/18 128/70   12/07/17 122/76   11/09/17 116/74    Weight from Last 3 Encounters:   01/04/18 190 lb (86.2 kg)   12/07/17 189 lb (85.7 kg)   11/09/17 184 lb (83.5 kg)              Today, you had the following     No orders found for display       Primary Care Provider Office Phone # Fax #    Rafael Carrasco [phone] [address] [facility] [address]  [facility] [address]        Equal Access to Services     [facility]: Hadii aad ku jaredo Sochelita, waaxda luqadaha, qaybta kaalmada adethuyada, alina carpenter. So [facility] [phone].    ATENCIÓN: Si habla español, tiene a tang disposición servicios gratuitos de asistencia lingüística. [facility] [phone].    We comply with applicable federal civil rights laws and [place] laws. We do not discriminate on the basis of race, color, national origin, age, disability, sex, sexual orientation, or gender identity.            Thank you!     Thank you for choosing [facility]  for your care. Our goal is always to provide you with excellent care. Hearing back from our patients is one way we can continue to improve our services. Please take a few minutes to complete the written survey that you may receive  in the mail after your visit with us. Thank you!             Your Updated Medication List - Protect others around you: Learn how to safely use, store and throw away your medicines at www.disposemymeds.org.      Notice  As of 1/4/2018 11:14 AM    You have not been prescribed any medications.

## 2018-01-10 NOTE — PROGRESS NOTES
January 10, 2018.      Lazara Brumfield DO   [facility]   [address]   [place]  [address]      RE: Guerita Jiménez   MRN: [id number]   : 1990      Dear Dr. Brumfield:      It was my pleasure to see Guerita Jiménez in clinic today regarding her fetus with a right-sided CPAM.      We discussed this diagnosis including the possibility of involution, the likelihood that it will persist given her child's age and size of the CPAM.  We discussed my recommendation that the child be assessed immediately in the postnasal period for any breathing difficulties.  If he has none, we can follow up with him in 3 months, at which time we will get a CT scan to verify the presence of the CPAM and assess the anatomy and consider surgical options.  I answered her questions and in all, we spent greater than 10 minutes together.      Thank you very much for allowing me to be involved in Ms. Jiménez's care.  Please contact me if I can be of further assistance.      Sincerely

## 2018-01-16 ENCOUNTER — TELEPHONE (OUTPATIENT)
Dept: OBGYN | Facility: CLINIC | Age: 28
End: 2018-01-16

## 2018-01-16 NOTE — TELEPHONE ENCOUNTER
Reason for Call:  Other call back    Detailed comments: patient;katarzyna ulloa is hurting her, and is scheduled with MI on Thursday. She is wondering if she should see a Chiropractor tomorrow?    Phone Number Patient can be reached at: Cell number on file:    Telephone Information:   Mobile [phone]       Best Time: tomorrow    Can we leave a detailed message on this number? YES    Call taken on 1/16/2018 at 4:12 PM by Norma Singletary

## 2018-01-18 ENCOUNTER — PRENATAL OFFICE VISIT (OUTPATIENT)
Dept: OBGYN | Facility: CLINIC | Age: 28
End: 2018-01-18
Payer: COMMERCIAL

## 2018-01-18 ENCOUNTER — OFFICE VISIT (OUTPATIENT)
Dept: MATERNAL FETAL MEDICINE | Facility: CLINIC | Age: 28
End: 2018-01-18
Attending: OBSTETRICS & GYNECOLOGY
Payer: COMMERCIAL

## 2018-01-18 ENCOUNTER — HOSPITAL ENCOUNTER (OUTPATIENT)
Dept: ULTRASOUND IMAGING | Facility: CLINIC | Age: 28
Discharge: HOME | End: 2018-01-18
Attending: OBSTETRICS & GYNECOLOGY | Admitting: OBSTETRICS & GYNECOLOGY
Payer: COMMERCIAL

## 2018-01-18 VITALS — SYSTOLIC BLOOD PRESSURE: 110 MMHG | WEIGHT: 194.2 LBS | DIASTOLIC BLOOD PRESSURE: 62 MMHG | BODY MASS INDEX: 36.69 KG/M2

## 2018-01-18 DIAGNOSIS — Q33.0 CONGENITAL PULMONARY AIRWAY MALFORMATION (CPAM): Primary | ICD-10-CM

## 2018-01-18 DIAGNOSIS — O35.CXX0 PREGNANCY COMPLICATED BY FETAL LUNG LESION, SINGLE OR UNSPECIFIED FETUS: Primary | ICD-10-CM

## 2018-01-18 DIAGNOSIS — Z34.02 ENCOUNTER FOR SUPERVISION OF NORMAL FIRST PREGNANCY IN SECOND TRIMESTER: ICD-10-CM

## 2018-01-18 DIAGNOSIS — Z3A.34 34 WEEKS GESTATION OF PREGNANCY: ICD-10-CM

## 2018-01-18 DIAGNOSIS — O35.9XX0 SUSPECTED ABNORMALITY OF FETUS AFFECTING MANAGEMENT OF MOTHER IN SINGLETON PREGNANCY: ICD-10-CM

## 2018-01-18 DIAGNOSIS — O41.03X0 OLIGOHYDRAMNIOS IN THIRD TRIMESTER, SINGLE OR UNSPECIFIED FETUS: ICD-10-CM

## 2018-01-18 PROCEDURE — 76819 FETAL BIOPHYS PROFIL W/O NST: CPT | Performed by: OBSTETRICS & GYNECOLOGY

## 2018-01-18 PROCEDURE — 99207 ZZC PRENATAL VISIT: CPT | Performed by: OBSTETRICS & GYNECOLOGY

## 2018-01-18 PROCEDURE — G0463 HOSPITAL OUTPT CLINIC VISIT: HCPCS | Mod: 25,ZF

## 2018-01-18 PROCEDURE — 59025 FETAL NON-STRESS TEST: CPT | Mod: ZF | Performed by: OBSTETRICS & GYNECOLOGY

## 2018-01-18 PROCEDURE — 76816 OB US FOLLOW-UP PER FETUS: CPT

## 2018-01-18 NOTE — MR AVS SNAPSHOT
After Visit Summary   1/18/2018    Guerita SILVA    MRN: [id number]           Patient Information     Date Of Birth          1990        Visit Information        Provider Department      1/18/2018 10:45 AM Lazara Brumfield DO [facility] Maternal Fetal Medicine [facility]        Today's Diagnoses     Congenital pulmonary airway malformation (CPAM)    -  1    Oligohydramnios in third trimester, single or unspecified fetus           Follow-ups after your visit        Your next 10 appointments already scheduled     Jan 25, 2018  3:00 PM CST   (Arrive by 2:45 PM)   MFM US COMPRE SINGLE F/U with [id number]   [facility] Maternal Fetal Medicine Ultrasound [facility] ([facility])    [address] [place] [address]   [phone]           Wear comfortable clothes and leave your valuables at home.            Jan 25, 2018  3:30 PM CST   Radiology MD with RADHA PEPE MD   [facility] Fetal [facility] ([facility])    [address] [place] [address]   [phone]           Please arrive at the time given for your first appointment. This visit is used internally to schedule the physician's time during your ultrasound.            Feb 01, 2018 11:00 AM CST   (Arrive by 10:45 AM)   MFM US COMPRE SINGLE F/U with SHMFMUSR1   [facility] Maternal Fetal Medicine Ultrasound - [facility] ([facility])    [address] [place] [address]   [phone]           Wear comfortable clothes and leave your valuables at home.            Feb 01, 2018 11:30 AM CST   Radiology MD with RADHA PEPE MD   [facility] Maternal Fetal Medicine [facility] ([facility])    [address] [place] [address]   [phone]           Please arrive at the time given for your first appointment. This visit is used internally to schedule the physician's time during your ultrasound.               Future tests that were ordered for you today     Open Future Orders        Priority Expected Expires Ordered    MFM US Comprehensive Single F/U Routine 1/25/2018 1/18/2019 1/18/2018            Who to contact     If you have questions or need follow up information about today's clinic visit or your schedule please contact [facility]ERNAL FETAL MEDICINE [facility] directly at [phone].  Normal or non-critical lab and imaging results will be communicated to you by [employer], letter or phone within 4 business days after the clinic has received the results. If you do not hear from us within 7 days, please contact the clinic through [employer] or phone. If you have a critical or abnormal lab result, we will notify you by phone as soon as possible.  Submit refill requests through [employer] or call your pharmacy and they will forward the refill request to us. Please allow 3 business days for your refill to be completed.          Additional Information About Your Visit        [employer] Information     [employer] gives you secure access to your electronic health record. If you see a primary care provider, you can also send messages to your care team and make appointments. If you have questions, please call your primary care clinic.  If you do not have a primary care provider, please call [phone] and they will assist you.        Care EveryWhere ID     This is your Care EveryWhere ID. This could be used by other organizations to access your [place] medical records  [id number]        Your Vitals Were     Last Period                   05/02/2017            Blood Pressure from Last 3 Encounters:   01/18/18 110/62   01/04/18 128/70   12/07/17 122/76    Weight from Last 3 Encounters:   01/18/18 88.1 kg (194 lb 3.2 oz)   01/04/18 86.2 kg (190 lb)   12/07/17 85.7 kg (189 lb)              We Performed the Following     FETAL NON-STRESS TEST        Primary Care Provider Office Phone # Fax #    Rafael Carrasco MD  [phone]       [address] [facility] [address]  [facility] [address]        Equal Access to Services     JUANITO PEDRAZA : Hadii jose siegel nadine Borrego, waalbertoda luestela, corineta kashylada safia, alina carpenter. So [facility] [phone].    ATENCIÓN: Si habla español, tiene a tang disposición servicios gratuitos de asistencia lingüística. Llame al [phone].    We comply with applicable federal civil rights laws and [place] laws. We do not discriminate on the basis of race, color, national origin, age, disability, sex, sexual orientation, or gender identity.            Thank you!     Thank you for choosing [facility]  for your care. Our goal is always to provide you with excellent care. Hearing back from our patients is one way we can continue to improve our services. Please take a few minutes to complete the written survey that you may receive in the mail after your visit with us. Thank you!             Your Updated Medication List - Protect others around you: Learn how to safely use, store and throw away your medicines at www.disposemymeds.org.          This list is accurate as of: 1/18/18 11:43 PM.  Always use your most recent med list.                   Brand Name Dispense Instructions for use Diagnosis    MULTIVITAMIN & MINERAL PO

## 2018-01-18 NOTE — NURSING NOTE
"NST Performed due to low amniotic fluid. Denies LOF, vaginal bleeding or contractions. Positive fetal movement. Complains of tailbone pain after she heard a \"pop\" while changing positions a couple days ago. Saw chiropractor yesterday and states it was better yesterday, but now feels worse today. Dr. Brumfield reviewed efm tracing. See NST/BPP Doc Flowsheet tab. Follow up ultrasound scheduled for next week.    Face to face time: 10 minutes  "

## 2018-01-18 NOTE — PROGRESS NOTES
Significant tail bone pain. Saw chiropractor and felt better but bad again today.  Baby active.  U/S today with MFM.  RTC 2 weeks.  Will discuss possible induction at 39 weeks if favorable.

## 2018-01-18 NOTE — MR AVS SNAPSHOT
After Visit Summary   1/18/2018    Guerita SILVA    MRN: [id number]           Patient Information     Date Of Birth          1990        Visit Information        Provider Department      1/18/2018 10:00 AM Rafael Carrasco MD [facility]        Today's Diagnoses     Pregnancy complicated by fetal lung lesion, single or unspecified fetus    -  1    Encounter for supervision of normal first pregnancy in second trimester        34 weeks gestation of pregnancy           Follow-ups after your visit        Your next 10 appointments already scheduled     Jan 18, 2018 10:45 AM CST   Radiology MD with RADHA PEPE MD   [facility] Maternal Fetal Medicine [facility] ([facility])    [address] [place] [id number] [phone]           Please arrive at the time given for your first appointment. This visit is used internally to schedule the physician's time during your ultrasound.            Feb 01, 2018 11:00 AM CST   (Arrive by 10:45 AM)   AFSHIN US COMPRE SINGLE F/U with [id number]   [facility] Maternal Fetal Medicine Ultrasound - [facility] ([facility])    [address] [place] [address] [phone]           Wear comfortable clothes and leave your valuables at home.            Feb 01, 2018 11:30 AM CST   Radiology MD with RADHA PEPE MD   [facility] Maternal Fetal Medicine [facility] ([facility])    [address] [place] [address] [phone]           Please arrive at the time given for your first appointment. This visit is used internally to schedule the physician's time during your ultrasound.              Who to contact     If you have questions or need follow up information about today's clinic visit or your schedule please contact [facility] [place] directly at [phone].  Normal or non-critical lab and imaging results will be communicated to you  by [employer], letter or phone within 4 business days after the clinic has received the results. If you do not hear from us within 7 days, please contact the clinic through [employer] or phone. If you have a critical or abnormal lab result, we will notify you by phone as soon as possible.  Submit refill requests through [employer] or call your pharmacy and they will forward the refill request to us. Please allow 3 business days for your refill to be completed.          Additional Information About Your Visit        [employer] Information     [employer] gives you secure access to your electronic health record. If you see a primary care provider, you can also send messages to your care team and make appointments. If you have questions, please call your primary care clinic.  If you do not have a primary care provider, please call [phone] and they will assist you.        Care EveryWhere ID     This is your Care EveryWhere ID. This could be used by other organizations to access your [place] medical records  [id number]        Your Vitals Were     Last Period BMI (Body Mass Index)                05/02/2017 36.69 kg/m2           Blood Pressure from Last 3 Encounters:   01/18/18 110/62   01/04/18 128/70   12/07/17 122/76    Weight from Last 3 Encounters:   01/18/18 194 lb 3.2 oz (88.1 kg)   01/04/18 190 lb (86.2 kg)   12/07/17 189 lb (85.7 kg)              Today, you had the following     No orders found for display       Primary Care Provider Office Phone # Fax #    Rafael Carrasco [phone] [address] [facility] [address]  [facility] [address]        Equal Access to Services     [facility]: Hadii aad ku hadasho Soomaali, waaxda luqadaha, qaybta kaalmada alina baig . So [facility] [phone].    ATENCIÓN: Si habla español, tiene a tang disposición servicios gratuitos de asistencia lingüística. Llame al [phone].    We comply with applicable federal civil rights laws and  [place] laws. We do not discriminate on the basis of race, color, national origin, age, disability, sex, sexual orientation, or gender identity.            Thank you!     Thank you for choosing [facility] [place]  for your care. Our goal is always to provide you with excellent care. Hearing back from our patients is one way we can continue to improve our services. Please take a few minutes to complete the written survey that you may receive in the mail after your visit with us. Thank you!             Your Updated Medication List - Protect others around you: Learn how to safely use, store and throw away your medicines at www.disposemymeds.org.          This list is accurate as of: 1/18/18 10:24 AM.  Always use your most recent med list.                   Brand Name Dispense Instructions for use Diagnosis    MULTIVITAMIN & MINERAL PO

## 2018-01-23 ENCOUNTER — OFFICE VISIT (OUTPATIENT)
Dept: MATERNAL FETAL MEDICINE | Facility: CLINIC | Age: 28
End: 2018-01-23
Attending: OBSTETRICS & GYNECOLOGY
Payer: COMMERCIAL

## 2018-01-23 ENCOUNTER — HOSPITAL ENCOUNTER (OUTPATIENT)
Dept: ULTRASOUND IMAGING | Facility: CLINIC | Age: 28
Discharge: HOME | End: 2018-01-23
Attending: OBSTETRICS & GYNECOLOGY | Admitting: OBSTETRICS & GYNECOLOGY
Payer: COMMERCIAL

## 2018-01-23 DIAGNOSIS — O28.8 AFI (AMNIOTIC FLUID INDEX) BORDERLINE LOW: Primary | ICD-10-CM

## 2018-01-23 DIAGNOSIS — Q33.0 CONGENITAL PULMONARY AIRWAY MALFORMATION (CPAM): ICD-10-CM

## 2018-01-23 DIAGNOSIS — O35.CXX0 PREGNANCY COMPLICATED BY FETAL LUNG LESION, SINGLE OR UNSPECIFIED FETUS: ICD-10-CM

## 2018-01-23 PROCEDURE — 76819 FETAL BIOPHYS PROFIL W/O NST: CPT

## 2018-01-23 NOTE — PROGRESS NOTES
"Please see \"Imaging\" tab under \"Chart Review\" for details of today's US at the [facility].    Mando Can MD  Maternal-Fetal Medicine      "

## 2018-01-23 NOTE — MR AVS SNAPSHOT
After Visit Summary   1/23/2018    Guerita SILVA    MRN: [id number]           Patient Information     Date Of Birth          1990        Visit Information        Provider Department      1/23/2018 11:30 AM Mando Can MD [facility] Maternal Fetal Medicine [facility]        Today's Diagnoses     KAREN (amniotic fluid index) borderline low    -  1    Pregnancy complicated by fetal lung lesion, single or unspecified fetus           Follow-ups after your visit        Your next 10 appointments already scheduled     Jan 25, 2018  3:00 PM CST   (Arrive by 2:45 PM)   MFM US COMPRE SINGLE F/U with [id number]   [facility] Maternal Fetal Medicine Ultrasound - [facility] ([facility])    [address] [place] [address]   [phone]           Wear comfortable clothes and leave your valuables at home.            Jan 25, 2018  3:30 PM CST   Radiology MD with RADHA PEPE MD   [facility] Maternal Fetal Medicine [facility])    [address] [place] [address]   [phone]           Please arrive at the time given for your first appointment. This visit is used internally to schedule the physician's time during your ultrasound.            Feb 01, 2018 11:00 AM CST   (Arrive by 10:45 AM)   MFM US COMPRE SINGLE F/U with [id number]   [facility] Maternal Fetal Medicine Ultrasound - [facility] ([facility])    [address] [place] [address]   [phone]           Wear comfortable clothes and leave your valuables at home.            Feb 01, 2018 11:30 AM CST   Radiology MD with RADHA PEPE MD   [facility] Maternal Fetal Medicine [facility] ([facility])    [address] [place] [address]   [phone]           Please arrive at the time given for your first appointment. This visit is used internally to schedule the physician's time during your ultrasound.               Future tests that were ordered for you today     Open Future Orders        Priority Expected Expires Ordered    Maternal Fetal BPP Single Routine  1/3/2019 1/3/2018            Who to contact     If you have questions or need follow up information about today's clinic visit or your schedule please contact [facility] directly at [phone].  Normal or non-critical lab and imaging results will be communicated to you by MyChart, letter or phone within 4 business days after the clinic has received the results. If you do not hear from us within 7 days, please contact the clinic through [employer] or phone. If you have a critical or abnormal lab result, we will notify you by phone as soon as possible.  Submit refill requests through [employer] or call your pharmacy and they will forward the refill request to us. Please allow 3 business days for your refill to be completed.          Additional Information About Your Visit        [employer] Information     [employer] gives you secure access to your electronic health record. If you see a primary care provider, you can also send messages to your care team and make appointments. If you have questions, please call your primary care clinic.  If you do not have a primary care provider, please call [phone] and they will assist you.        Care EveryWhere ID     This is your Care EveryWhere ID. This could be used by other organizations to access your [place] medical records  [id number]        Your Vitals Were     Last Period                   05/02/2017            Blood Pressure from Last 3 Encounters:   01/18/18 110/62   01/04/18 128/70   12/07/17 122/76    Weight from Last 3 Encounters:   01/18/18 88.1 kg (194 lb 3.2 oz)   01/04/18 86.2 kg (190 lb)   12/07/17 85.7 kg (189 lb)              Today, you had the following     No orders found for display       Primary Care Provider Office Phone # Fax #    Rafael Carrasco [phone]       [address] [facility] [address]  [place] [address]        Equal Access to Services     JUANITO PEDRAZA : Hadii aad ku hadmaverickjami Soraali, waalbertoda lujoeyrichmondha, corineta kashylada sudheerreji, alina crespoin hayaaradha willardthu peterson lacristoradha carpenter. So [facility] [phone].    ATENCIÓN: Si habla español, tiene a tang disposición servicios gratuitos de asistencia lingüística. Llame al [phone].    We comply with applicable federal civil rights laws and [place] laws. We do not discriminate on the basis of race, color, national origin, age, disability, sex, sexual orientation, or gender identity.            Thank you!     Thank you for choosing [facility]  for your care. Our goal is always to provide you with excellent care. Hearing back from our patients is one way we can continue to improve our services. Please take a few minutes to complete the written survey that you may receive in the mail after your visit with us. Thank you!             Your Updated Medication List - Protect others around you: Learn how to safely use, store and throw away your medicines at www.disposemymeds.org.          This list is accurate as of: 1/23/18 11:54 AM.  Always use your most recent med list.                   Brand Name Dispense Instructions for use Diagnosis    MULTIVITAMIN & MINERAL PO

## 2018-01-25 ENCOUNTER — OFFICE VISIT (OUTPATIENT)
Dept: MATERNAL FETAL MEDICINE | Facility: CLINIC | Age: 28
End: 2018-01-25
Attending: OBSTETRICS & GYNECOLOGY
Payer: COMMERCIAL

## 2018-01-25 ENCOUNTER — HOSPITAL ENCOUNTER (OUTPATIENT)
Dept: ULTRASOUND IMAGING | Facility: CLINIC | Age: 28
Discharge: HOME | End: 2018-01-25
Attending: OBSTETRICS & GYNECOLOGY | Admitting: OBSTETRICS & GYNECOLOGY
Payer: COMMERCIAL

## 2018-01-25 DIAGNOSIS — O41.03X0 OLIGOHYDRAMNIOS IN THIRD TRIMESTER, SINGLE OR UNSPECIFIED FETUS: ICD-10-CM

## 2018-01-25 DIAGNOSIS — Q33.0 CONGENITAL PULMONARY AIRWAY MALFORMATION (CPAM): Primary | ICD-10-CM

## 2018-01-25 PROCEDURE — 76816 OB US FOLLOW-UP PER FETUS: CPT

## 2018-01-25 NOTE — MR AVS SNAPSHOT
After Visit Summary   1/25/2018    Guerita SILVA    MRN: [id number]           Patient Information     Date Of Birth          1990        Visit Information        Provider Department      1/25/2018 3:30 PM Cande Joseph MD [facility] Maternal Fetal Medicine [facility]        Today's Diagnoses     Congenital pulmonary airway malformation (CPAM)    -  1       Follow-ups after your visit        Your next 10 appointments already scheduled     Jan 29, 2018  3:00 PM CST   (Arrive by 2:45 PM)   MFM US COMPRE SINGLE F/U with [id number]   [facility] Maternal Fetal Medicine Ultrasound [facility] ([facility])    [address] [place] [address]   [phone]           Wear comfortable clothes and leave your valuables at home.            Jan 29, 2018  3:30 PM CST   Radiology MD with RADHA PEPE MD   [facility] Maternal Fetal Medicine [facility] ([facility])    [address] [place] [address]   [phone]           Please arrive at the time given for your first appointment. This visit is used internally to schedule the physician's time during your ultrasound.            Feb 01, 2018 11:00 AM CST   (Arrive by 10:45 AM)   MFM US COMPRE SINGLE F/U with SHMFMUSR1   [facility] Maternal Fetal Medicine Ultrasound [facility] ([facility])    [address] [place] [address]   [phone]           Wear comfortable clothes and leave your valuables at home.            Feb 01, 2018 11:30 AM CST   Radiology MD with RADAH PEPE MD   [facility] Maternal Fetal Medicine [facility] ([facility])    [address] [place] [address]   [phone]           Please arrive at the time given for your first appointment. This visit is used internally to schedule the physician's time during your ultrasound.            Feb 05, 2018  8:00 AM CST   (Arrive by 7:45 AM)   MFM US COMPRE  SINGLE F/U with SHMFMUSR1   [facility] Maternal Fetal Medicine Ultrasound - [facility] ([facility])    [address] [place] [address] [phone]           Wear comfortable clothes and leave your valuables at home.            Feb 05, 2018  8:30 AM CST   Radiology MD with RADHA PEPE MD   [facility] Maternal Fetal Medicine [facility] ([facility])    [address] [place] [address] [phone]           Please arrive at the time given for your first appointment. This visit is used internally to schedule the physician's time during your ultrasound.            Feb 08, 2018 11:00 AM CST   (Arrive by 10:45 AM)   M US COMPRE SINGLE F/U with SHMFMUSR3   [facility] Maternal Fetal Medicine Ultrasound - [facility] ([facility])    [address] [place] [address] [phone]           Wear comfortable clothes and leave your valuables at home.            Feb 08, 2018 11:30 AM CST   Radiology MD with RADHA PEPE MD   [facility] Maternal Fetal Medicine [facility] ([facility])    [address] [place] [id number] [phone]           Please arrive at the time given for your first appointment. This visit is used internally to schedule the physician's time during your ultrasound.              Future tests that were ordered for you today     Open Future Orders        Priority Expected Expires Ordered    MFM US Comprehensive Single F/U Routine 1/29/2018 1/25/2019 1/25/2018    MFM US Comprehensive Single F/U Routine 2/5/2018 1/25/2019 1/25/2018    MFM US Comprehensive Single F/U Routine 2/8/2018 1/25/2019 1/25/2018            Who to contact     If you have questions or need follow up information about today's clinic visit or your schedule please contact [facility] MATERNAL FETAL [facility] directly at [phone].  Normal or non-critical lab and imaging results will be  communicated to you by [employer], letter or phone within 4 business days after the clinic has received the results. If you do not hear from us within 7 days, please contact the clinic through [employer] or phone. If you have a critical or abnormal lab result, we will notify you by phone as soon as possible.  Submit refill requests through [employer] or call your pharmacy and they will forward the refill request to us. Please allow 3 business days for your refill to be completed.          Additional Information About Your Visit        [employer] Information     [employer] gives you secure access to your electronic health record. If you see a primary care provider, you can also send messages to your care team and make appointments. If you have questions, please call your primary care clinic.  If you do not have a primary care provider, please call [phone] and they will assist you.        Care EveryWhere ID     This is your Care EveryWhere ID. This could be used by other organizations to access your [place] medical records  [id number]        Your Vitals Were     Last Period                   05/02/2017            Blood Pressure from Last 3 Encounters:   01/18/18 110/62   01/04/18 128/70   12/07/17 122/76    Weight from Last 3 Encounters:   01/18/18 88.1 kg (194 lb 3.2 oz)   01/04/18 86.2 kg (190 lb)   12/07/17 85.7 kg (189 lb)               Primary Care Provider Office Phone # Fax #    Rafael Carrasco [phone] [address] [facility] [address]  [facility] [address]        Equal Access to Services     [facility] : Hadii aad ku hadasho Soomaali, waaxda luqadaha, qaybta kaalmada adeegyada, waxay sudhakar haytaln maulik neff'hoang . So [facility] [phone].    ATENCIÓN: Si habla español, tiene a tang disposición servicios gratuitos de asistencia lingüística. Llame al [phone].    We comply with applicable federal civil rights laws and [place] laws. We do not discriminate on the basis of race, color, national  origin, age, disability, sex, sexual orientation, or gender identity.            Thank you!     Thank you for choosing [facility]  for your care. Our goal is always to provide you with excellent care. Hearing back from our patients is one way we can continue to improve our services. Please take a few minutes to complete the written survey that you may receive in the mail after your visit with us. Thank you!             Your Updated Medication List - Protect others around you: Learn how to safely use, store and throw away your medicines at www.disposemymeds.org.          This list is accurate as of 1/25/18  4:36 PM.  Always use your most recent med list.                   Brand Name Dispense Instructions for use Diagnosis    MULTIVITAMIN & MINERAL PO

## 2018-01-25 NOTE — PROGRESS NOTES
Please see ultrasound report under imaging tab for details on ultrasound performed today.    Cande Joseph MD  , OB/GYN  Maternal-Fetal Medicine  [email or web address]  [phone] (Academic office)  [phone] (Pager)

## 2018-01-29 ENCOUNTER — HOSPITAL ENCOUNTER (OUTPATIENT)
Dept: ULTRASOUND IMAGING | Facility: CLINIC | Age: 28
Discharge: HOME | End: 2018-01-29
Attending: OBSTETRICS & GYNECOLOGY | Admitting: OBSTETRICS & GYNECOLOGY
Payer: COMMERCIAL

## 2018-01-29 ENCOUNTER — OFFICE VISIT (OUTPATIENT)
Dept: MATERNAL FETAL MEDICINE | Facility: CLINIC | Age: 28
End: 2018-01-29
Attending: OBSTETRICS & GYNECOLOGY
Payer: COMMERCIAL

## 2018-01-29 DIAGNOSIS — Q33.0 CONGENITAL PULMONARY AIRWAY MALFORMATION (CPAM): ICD-10-CM

## 2018-01-29 DIAGNOSIS — O28.8 AFI (AMNIOTIC FLUID INDEX) BORDERLINE LOW: ICD-10-CM

## 2018-01-29 DIAGNOSIS — Q33.0 CONGENITAL PULMONARY AIRWAY MALFORMATION (CPAM): Primary | ICD-10-CM

## 2018-01-29 PROCEDURE — 76819 FETAL BIOPHYS PROFIL W/O NST: CPT

## 2018-01-29 PROCEDURE — 76816 OB US FOLLOW-UP PER FETUS: CPT

## 2018-01-29 NOTE — PROGRESS NOTES
Please see ultrasound report under imaging tab for details on ultrasound performed today.    Cande Joseph MD  , OB/GYN  Maternal-Fetal Medicine  [email or web address]  [phone] (Academic office)  [phone] (Pager)

## 2018-01-29 NOTE — MR AVS SNAPSHOT
After Visit Summary   1/29/2018    Guerita SILVA    MRN: [id number]           Patient Information     Date Of Birth          1990        Visit Information        Provider Department      1/29/2018 3:30 PM Cande Joseph MD [facility] Maternal Fetal Medicine [facility]        Today's Diagnoses     Congenital pulmonary airway malformation (CPAM)    -  1    KAREN (amniotic fluid index) borderline low        Poor fetal growth           Follow-ups after your visit        Your next 10 appointments already scheduled     Feb 01, 2018 11:00 AM CST   (Arrive by 10:45 AM)   MFM US COMPRE SINGLE F/U with SHMFMUSR1   [facility] Maternal Fetal Medicine Ultrasound [facility] ([facility])    [address] [place] [address]   [phone]           Wear comfortable clothes and leave your valuables at home.            Feb 01, 2018 11:30 AM CST   Radiology MD with RADHA PEPE MD   [facility] Fetal [facility] ([facility])    [address] [place] [address]   [phone]           Please arrive at the time given for your first appointment. This visit is used internally to schedule the physician's time during your ultrasound.            Feb 05, 2018  8:00 AM CST   (Arrive by 7:45 AM)   MFM US COMPRE SINGLE F/U with SHMFMUSR1   [facility] Maternal Fetal Medicine Ultrasound - [facility] ([facility])    [address] [place] [address]   [phone]           Wear comfortable clothes and leave your valuables at home.            Feb 05, 2018  8:30 AM CST   Radiology MD with  AFSHIN ROSE   [facility] Maternal Fetal Medicine [facility] ([facility])    [address] [place] [address]   [phone]           Please arrive at the time given for your first appointment. This visit is used internally to schedule the physician's time during your ultrasound.             Feb 08, 2018 11:00 AM CST   (Arrive by 10:45 AM)   AFSHIN US COMPRE SINGLE F/U with SHMFMUSR3   [facility] Maternal Fetal Medicine Ultrasound - [facility] ([facility])    [address] [place] [phone]           Wear comfortable clothes and leave your valuables at home.            Feb 08, 2018 11:30 AM CST   Radiology MD with SH AFSHIN ROSE   [facility] Maternal Fetal Medicine - [facility])    [address] [place] [phone]           Please arrive at the time given for your first appointment. This visit is used internally to schedule the physician's time during your ultrasound.              Who to contact     If you have questions or need follow up information about today's clinic visit or your schedule please contact [facility] MATERNAL FETAL MEDICINE [facility] directly at [phone].  Normal or non-critical lab and imaging results will be communicated to you by Cloudkickhart, letter or phone within 4 business days after the clinic has received the results. If you do not hear from us within 7 days, please contact the clinic through ClickGanic or phone. If you have a critical or abnormal lab result, we will notify you by phone as soon as possible.  Submit refill requests through ClickGanic or call your pharmacy and they will forward the refill request to us. Please allow 3 business days for your refill to be completed.          Additional Information About Your Visit        ClickGanic Information     ClickGanic gives you secure access to your electronic health record. If you see a primary care provider, you can also send messages to your care team and make appointments. If you have questions, please call your primary care clinic.  If you do not have a primary care provider, please call [phone] and they will assist you.        Care EveryWhere ID     This is your Care EveryWhere ID. This could be used by other  organizations to access your [place] medical records  [id number]        Your Vitals Were     Last Period                   05/02/2017            Blood Pressure from Last 3 Encounters:   01/18/18 110/62   01/04/18 128/70   12/07/17 122/76    Weight from Last 3 Encounters:   01/18/18 88.1 kg (194 lb 3.2 oz)   01/04/18 86.2 kg (190 lb)   12/07/17 85.7 kg (189 lb)              Today, you had the following     No orders found for display       Primary Care Provider Office Phone # Fax #    Rafael Marcin Carrasco [phone] [address] [address] [address]  [place] [address]        Equal Access to Services     [facility]ERT[facility]STINA : Hadii jose colemano [place], waaxda lujoeyadaha, qaybta kaalmada adethuyada, alina hylton . So [facility] [phone].    ATENCIÓN: Si habla español, tiene a tang disposición servicios gratuitos de asistencia lingüística. Llame al [phone].    We comply with applicable federal civil rights laws and [place] laws. We do not discriminate on the basis of race, color, national origin, age, disability, sex, sexual orientation, or gender identity.            Thank you!     Thank you for choosing [facility]  for your care. Our goal is always to provide you with excellent care. Hearing back from our patients is one way we can continue to improve our services. Please take a few minutes to complete the written survey that you may receive in the mail after your visit with us. Thank you!             Your Updated Medication List - Protect others around you: Learn how to safely use, store and throw away your medicines at www.disposemymeds.org.          This list is accurate as of 1/29/18  4:16 PM.  Always use your most recent med list.                   Brand Name Dispense Instructions for use Diagnosis    MULTIVITAMIN & MINERAL PO

## 2018-01-30 ENCOUNTER — TELEPHONE (OUTPATIENT)
Dept: NURSING | Facility: CLINIC | Age: 28
End: 2018-01-30

## 2018-01-30 NOTE — TELEPHONE ENCOUNTER
36w1d; RAGHAV: 2/26/18  Pt calling wondering if she needs to be seen at our office this wk. Informed pt that yes, even if she is seeing MFM we still need to see her for her routine OB care. Informed pt that per Dr. Carrasco's last visit with her (1/18/18) he noted he wanted to see her in 2 wks which would be this wk. Pt verbalized understanding and transferred pt call back to scheduling to schedule OB appt.         1/18/18 OB Visit note from Dr. Carrasco:  Significant tail bone pain. Saw chiropractor and felt better but bad again today.  Baby active.  U/S today with MFM.  RTC 2 weeks.  Will discuss possible induction at 39 weeks if favorable.

## 2018-02-01 ENCOUNTER — OFFICE VISIT (OUTPATIENT)
Dept: MATERNAL FETAL MEDICINE | Facility: CLINIC | Age: 28
End: 2018-02-01
Attending: OBSTETRICS & GYNECOLOGY
Payer: COMMERCIAL

## 2018-02-01 ENCOUNTER — HOSPITAL ENCOUNTER (OUTPATIENT)
Dept: ULTRASOUND IMAGING | Facility: CLINIC | Age: 28
Discharge: HOME | End: 2018-02-01
Attending: OBSTETRICS & GYNECOLOGY | Admitting: OBSTETRICS & GYNECOLOGY
Payer: COMMERCIAL

## 2018-02-01 ENCOUNTER — PRENATAL OFFICE VISIT (OUTPATIENT)
Dept: OBGYN | Facility: CLINIC | Age: 28
End: 2018-02-01
Payer: COMMERCIAL

## 2018-02-01 VITALS — SYSTOLIC BLOOD PRESSURE: 120 MMHG | DIASTOLIC BLOOD PRESSURE: 80 MMHG | WEIGHT: 199.6 LBS | BODY MASS INDEX: 37.71 KG/M2

## 2018-02-01 DIAGNOSIS — O35.9XX0 SUSPECTED ABNORMALITY OF FETUS AFFECTING MANAGEMENT OF MOTHER IN SINGLETON PREGNANCY: ICD-10-CM

## 2018-02-01 DIAGNOSIS — O35.9XX0 FETAL ABNORMALITY AFFECTING MANAGEMENT OF MOTHER, SINGLE OR UNSPECIFIED FETUS: Primary | ICD-10-CM

## 2018-02-01 DIAGNOSIS — Z34.02 ENCOUNTER FOR SUPERVISION OF NORMAL FIRST PREGNANCY IN SECOND TRIMESTER: ICD-10-CM

## 2018-02-01 DIAGNOSIS — Z3A.36 36 WEEKS GESTATION OF PREGNANCY: ICD-10-CM

## 2018-02-01 DIAGNOSIS — Z36.85 ANTENATAL SCREENING FOR STREPTOCOCCUS B: Primary | ICD-10-CM

## 2018-02-01 DIAGNOSIS — O35.CXX0 PREGNANCY COMPLICATED BY FETAL LUNG LESION, SINGLE OR UNSPECIFIED FETUS: ICD-10-CM

## 2018-02-01 PROCEDURE — G0463 HOSPITAL OUTPT CLINIC VISIT: HCPCS | Mod: 25,ZF

## 2018-02-01 PROCEDURE — 76816 OB US FOLLOW-UP PER FETUS: CPT

## 2018-02-01 PROCEDURE — 99207 ZZC PRENATAL VISIT: CPT | Performed by: OBSTETRICS & GYNECOLOGY

## 2018-02-01 PROCEDURE — 76818 FETAL BIOPHYS PROFILE W/NST: CPT | Performed by: OBSTETRICS & GYNECOLOGY

## 2018-02-01 PROCEDURE — 87653 STREP B DNA AMP PROBE: CPT | Performed by: OBSTETRICS & GYNECOLOGY

## 2018-02-01 NOTE — PROGRESS NOTES
BPP today with low normal KAREN.   Baby active.   No CTXs.  Cx unreachable. Pt has very tight introitus and cx posterior.   BPPS twice a week.   Discussed possible change in status and need for early induction.   Discussed possible fetal intolerance to labor and need for C/S.  RTC 1 week.

## 2018-02-01 NOTE — NURSING NOTE
NST performed due to BPP 6/8 (no fetal breathing movements seen).   reviewed EFM tracing. See NST/BPP Doc Flowsheet tab.  Okay to discharge home with follow up here in our MFM Clinic on Monday (2-5-18).  Guerita aware of care plan and all questions answered.

## 2018-02-01 NOTE — PROGRESS NOTES
Please see full imaging report from ViewPoint program under imaging tab.        Fercho Rivers MD  Maternal Fetal Medicine

## 2018-02-01 NOTE — MR AVS SNAPSHOT
After Visit Summary   2/1/2018    Guerita SILVA    MRN: [id number]           Patient Information     Date Of Birth          1990        Visit Information        Provider Department      2/1/2018 11:30 AM Fercho Rivers MD [facility] Maternal Fetal Medicine [facility]        Today's Diagnoses     Fetal abnormality affecting management of mother, single or unspecified fetus    -  1       Follow-ups after your visit        Your next 10 appointments already scheduled     Feb 05, 2018  8:00 AM CST   (Arrive by 7:45 AM)   MFM US COMPRE SINGLE F/U with SHMFMUSR1   [facility] Maternal Fetal Medicine Ultrasound - [facility])    [address] [place] [address]   [phone]           Wear comfortable clothes and leave your valuables at home.            Feb 05, 2018  8:30 AM CST   Radiology MD with RADHA EPPE MD   [facility] Fetal [facility][1] ([facility])    [address] [place] [address]   [phone]           Please arrive at the time given for your first appointment. This visit is used internally to schedule the physician's time during your ultrasound.            Feb 08, 2018 11:00 AM CST   (Arrive by 10:45 AM)   MFM US COMPRE SINGLE F/U with SHMFMUSR3   [facility] Maternal Fetal Medicine Ultrasound - [facility] ([facility])    [address] [place] [address]   [phone]           Wear comfortable clothes and leave your valuables at home.            Feb 08, 2018 11:30 AM CST   Radiology MD with RADHA PEPE MD   [facility] Maternal Fetal Medicine [facility] ([facility])    [address] [place] [address]   [phone]           Please arrive at the time given for your first appointment. This visit is used internally to schedule the physician's time during your ultrasound.              Who to contact     If you have  questions or need follow up information about today's clinic visit or your schedule please contact [facility] MATERNAL FETAL MEDICINE [facility] directly at [phone].  Normal or non-critical lab and imaging results will be communicated to you by MyChart, letter or phone within 4 business days after the clinic has received the results. If you do not hear from us within 7 days, please contact the clinic through Bardolino Grillehart or phone. If you have a critical or abnormal lab result, we will notify you by phone as soon as possible.  Submit refill requests through BiBCOM or call your pharmacy and they will forward the refill request to us. Please allow 3 business days for your refill to be completed.          Additional Information About Your Visit        BiBCOM Information     BiBCOM gives you secure access to your electronic health record. If you see a primary care provider, you can also send messages to your care team and make appointments. If you have questions, please call your primary care clinic.  If you do not have a primary care provider, please call [phone] and they will assist you.        Care EveryWhere ID     This is your Care EveryWhere ID. This could be used by other organizations to access your [place] medical records  [id number]        Your Vitals Were     Last Period                   05/02/2017            Blood Pressure from Last 3 Encounters:   02/01/18 120/80   01/18/18 110/62   01/04/18 128/70    Weight from Last 3 Encounters:   02/01/18 90.5 kg (199 lb 9.6 oz)   01/18/18 88.1 kg (194 lb 3.2 oz)   01/04/18 86.2 kg (190 lb)              Today, you had the following     No orders found for display       Primary Care Provider Office Phone # Fax #    Rafael Carrasco [phone] [address]  [place] [address]        Equal Access to Services     JUANITO PEDRAZA AH: Tunde Borrego, scott benedict, qaybta alina dickerson  kristen hylton ah. So [facility] [phone].    ATENCIÓN: Si habla kevon, tiene a tang disposición servicios gratuitos de asistencia lingüística. Emmy al [phone].    We comply with applicable federal civil rights laws and [place] laws. We do not discriminate on the basis of race, color, national origin, age, disability, sex, sexual orientation, or gender identity.            Thank you!     Thank you for choosing [facility]  for your care. Our goal is always to provide you with excellent care. Hearing back from our patients is one way we can continue to improve our services. Please take a few minutes to complete the written survey that you may receive in the mail after your visit with us. Thank you!             Your Updated Medication List - Protect others around you: Learn how to safely use, store and throw away your medicines at www.disposemymeds.org.          This list is accurate as of 2/1/18  1:04 PM.  Always use your most recent med list.                   Brand Name Dispense Instructions for use Diagnosis    MULTIVITAMIN & MINERAL PO

## 2018-02-01 NOTE — MR AVS SNAPSHOT
After Visit Summary   2018    Guerita SILVA    MRN: [id number]           Patient Information     Date Of Birth          1990        Visit Information        Provider Department      2018 10:40 AM Rafael Carrasco MD [facility]        Today's Diagnoses      screening for streptococcus B    -  1    Encounter for supervision of normal first pregnancy in second trimester        Pregnancy complicated by fetal lung lesion, single or unspecified fetus        36 weeks gestation of pregnancy           Follow-ups after your visit        Your next 10 appointments already scheduled     2018  8:00 AM CST   (Arrive by 7:45 AM)   MFM US COMPRE SINGLE F/U with SHMFMUSR1   [facility] Maternal Fetal [facility]ound - [facility] ([facility])    [address] [place] [phone]           Wear comfortable clothes and leave your valuables at home.            2018  8:30 AM CST   Radiology MD with RADHA PEPE MD   [facility] Maternal Fetal Medicine [facility] ([facility])    [address] [place] [phone]   [phone]           Please arrive at the time given for your first appointment. This visit is used internally to schedule the physician's time during your ultrasound.            2018 11:00 AM CST   (Arrive by 10:45 AM)   MFM US COMPRE SINGLE F/U with SHMFMUSR3   [facility] Maternal [facility]etal Medicine Ultrasound - [facility] ([facility])    [address] [place] [address]   [phone]           Wear comfortable clothes and leave your valuables at home.            2018 11:30 AM CST   Radiology MD with RADHA PEPE MD   [facility]etal [facility] ([facility])    [address] [place] [address]   [phone]           Please arrive at the time given for your first  appointment. This visit is used internally to schedule the physician's time during your ultrasound.              Who to contact     If you have questions or need follow up information about today's clinic visit or your schedule please contact [facility] [place] directly at [phone].  Normal or non-critical lab and imaging results will be communicated to you by MyChart, letter or phone within 4 business days after the clinic has received the results. If you do not hear from us within 7 days, please contact the clinic through Transera Communicationshart or phone. If you have a critical or abnormal lab result, we will notify you by phone as soon as possible.  Submit refill requests through [employer] World Partners or call your pharmacy and they will forward the refill request to us. Please allow 3 business days for your refill to be completed.          Additional Information About Your Visit        MyChart Information     [employer] gives you secure access to your electronic health record. If you see a primary care provider, you can also send messages to your care team and make appointments. If you have questions, please call your primary care clinic.  If you do not have a primary care provider, please call [phone] and they will assist you.        Care EveryWhere ID     This is your Care EveryWhere ID. This could be used by other organizations to access your [place] medical records  [id number]        Your Vitals Were     Last Period BMI (Body Mass Index)                05/02/2017 37.71 kg/m2           Blood Pressure from Last 3 Encounters:   02/01/18 120/80   01/18/18 110/62   01/04/18 128/70    Weight from Last 3 Encounters:   02/01/18 199 lb 9.6 oz (90.5 kg)   01/18/18 194 lb 3.2 oz (88.1 kg)   01/04/18 190 lb (86.2 kg)              We Performed the Following     Group B strep PCR        Primary Care Provider Office Phone # Fax #    Rafael Carrasco [phone] [address]  [place] [address]         Equal Access to Services     [facility] CenterKRISTINA : Hadii aad ku hadmaverickjami Kristichelita, waalbertoda luqadaha, qakedarta jennashylaalina rashid. So [facility] [phone].    ATENCIÓN: Si habla español, tiene a tang disposición servicios gratuitos de asistencia lingüística. Llame al [phone].    We comply with applicable federal civil rights laws and [place] laws. We do not discriminate on the basis of race, color, national origin, age, disability, sex, sexual orientation, or gender identity.            Thank you!     Thank you for choosing [facility]  for your care. Our goal is always to provide you with excellent care. Hearing back from our patients is one way we can continue to improve our services. Please take a few minutes to complete the written survey that you may receive in the mail after your visit with us. Thank you!             Your Updated Medication List - Protect others around you: Learn how to safely use, store and throw away your medicines at www.disposemymeds.org.          This list is accurate as of 2/1/18 12:49 PM.  Always use your most recent med list.                   Brand Name Dispense Instructions for use Diagnosis    MULTIVITAMIN & MINERAL PO

## 2018-02-02 LAB
GP B STREP DNA SPEC QL NAA+PROBE: NEGATIVE
SPECIMEN SOURCE: NORMAL

## 2018-02-05 ENCOUNTER — OFFICE VISIT (OUTPATIENT)
Dept: MATERNAL FETAL MEDICINE | Facility: CLINIC | Age: 28
End: 2018-02-05
Attending: OBSTETRICS & GYNECOLOGY
Payer: COMMERCIAL

## 2018-02-05 ENCOUNTER — HOSPITAL ENCOUNTER (OUTPATIENT)
Dept: ULTRASOUND IMAGING | Facility: CLINIC | Age: 28
Discharge: HOME | End: 2018-02-05
Attending: OBSTETRICS & GYNECOLOGY | Admitting: OBSTETRICS & GYNECOLOGY
Payer: COMMERCIAL

## 2018-02-05 DIAGNOSIS — Q33.0 CONGENITAL PULMONARY AIRWAY MALFORMATION (CPAM): Primary | ICD-10-CM

## 2018-02-05 DIAGNOSIS — Q33.0 CONGENITAL PULMONARY AIRWAY MALFORMATION (CPAM): ICD-10-CM

## 2018-02-05 PROCEDURE — 76819 FETAL BIOPHYS PROFIL W/O NST: CPT

## 2018-02-05 NOTE — MR AVS SNAPSHOT
After Visit Summary   2/5/2018    Guerita SILVA    MRN: [id number]           Patient Information     Date Of Birth          1990        Visit Information        Provider Department      2/5/2018 8:30 AM Lizzette Ambrocio,  [facility] Maternal Fetal Medicine [facility]        Today's Diagnoses     Congenital pulmonary airway malformation (CPAM)    -  1       Follow-ups after your visit        Your next 10 appointments already scheduled     Feb 08, 2018 11:00 AM CST   (Arrive by 10:45 AM)   [facility] US COMPRE SINGLE F/U with SHMFMUSR3   [facility] Maternal Fetal Medicine Ultrasound - [facility] ([facility])    [address] [place] [address] [address]  [place] [phone] [phone]           Wear comfortable clothes and leave your valuables at home.            Feb 08, 2018 11:30 AM CST   Radiology MD with [facility] MD   [facility] Maternal Fetal Medicine [facility] ([facility])    [address] [place] [address] [address]  [place] [phone] [phone]           Please arrive at the time given for your first appointment. This visit is used internally to schedule the physician's time during your ultrasound.              Future tests that were ordered for you today     Open Future Orders        Priority Expected Expires Ordered    Maternal Fetal BPP Single Routine 2/5/2018 1/25/2019 1/25/2018            Who to contact     If you have questions or need follow up information about today's clinic visit or your schedule please contact [facility] MATERNAL FETAL MEDIC[facility] directly at [phone].  Normal or non-critical lab and imaging results will be communicated to you by MyChart, letter or phone within 4 business days after the clinic has received the results. If you do not hear from us within 7 days, please contact the clinic through MyChart or phone. If you have a critical or abnormal lab result, we will notify you by phone as soon as possible.  Submit refill requests  through Nitero or call your pharmacy and they will forward the refill request to us. Please allow 3 business days for your refill to be completed.          Additional Information About Your Visit        [employer]liance Medical Managementhart Information     Nitero gives you secure access to your electronic health record. If you see a primary care provider, you can also send messages to your care team and make appointments. If you have questions, please call your primary care clinic.  If you do not have a primary care provider, please call [phone] and they will assist you.        Care EveryWhere ID     This is your Care EveryWhere ID. This could be used by other organizations to access your [place] medical records  [id number]        Your Vitals Were     Last Period                   05/02/2017            Blood Pressure from Last 3 Encounters:   02/01/18 120/80   01/18/18 110/62   01/04/18 128/70    Weight from Last 3 Encounters:   02/01/18 90.5 kg (199 lb 9.6 oz)   01/18/18 88.1 kg (194 lb 3.2 oz)   01/04/18 86.2 kg (190 lb)              Today, you had the following     No orders found for display       Primary Care Provider Office Phone # Fax #    Rafael Carrasco [phone] [address] [address]        Equal Access to Services     JUANITO PEDRAZA : Hadii aad ku hadasho Soomaali, waaxda luqadaha, qaybta kaalmada adeegyada, alina hylton . So [facility] [phone].    ATENCIÓN: Si habla español, tiene a tang disposición servicios gratuitos de asistencia lingüística. Llame al [phone].    We comply with applicable federal civil rights laws and [place] laws. We do not discriminate on the basis of race, color, national origin, age, disability, sex, sexual orientation, or gender identity.            Thank you!     Thank you for choosing [facility]  for your care. Our goal is always to provide you with excellent care. Hearing back from our  patients is one way we can continue to improve our services. Please take a few minutes to complete the written survey that you may receive in the mail after your visit with us. Thank you!             Your Updated Medication List - Protect others around you: Learn how to safely use, store and throw away your medicines at www.disposemymeds.org.          This list is accurate as of 2/5/18  8:48 AM.  Always use your most recent med list.                   Brand Name Dispense Instructions for use Diagnosis    MULTIVITAMIN & MINERAL PO

## 2018-02-05 NOTE — PROGRESS NOTES
"Please see \"Imaging\" tab under \"Chart Review\" for details of today's US.      Lizzette Amrbocio, DO  Maternal-Fetal Medicine        "

## 2018-02-08 ENCOUNTER — HOSPITAL ENCOUNTER (OUTPATIENT)
Dept: ULTRASOUND IMAGING | Facility: CLINIC | Age: 28
Discharge: HOME | End: 2018-02-08
Attending: OBSTETRICS & GYNECOLOGY | Admitting: OBSTETRICS & GYNECOLOGY
Payer: COMMERCIAL

## 2018-02-08 ENCOUNTER — OFFICE VISIT (OUTPATIENT)
Dept: MATERNAL FETAL MEDICINE | Facility: CLINIC | Age: 28
End: 2018-02-08
Attending: OBSTETRICS & GYNECOLOGY
Payer: COMMERCIAL

## 2018-02-08 ENCOUNTER — PRENATAL OFFICE VISIT (OUTPATIENT)
Dept: OBGYN | Facility: CLINIC | Age: 28
End: 2018-02-08
Payer: COMMERCIAL

## 2018-02-08 VITALS — DIASTOLIC BLOOD PRESSURE: 70 MMHG | BODY MASS INDEX: 37.6 KG/M2 | SYSTOLIC BLOOD PRESSURE: 132 MMHG | WEIGHT: 199 LBS

## 2018-02-08 DIAGNOSIS — Z34.03 SUPERVISION OF NORMAL FIRST PREGNANCY IN THIRD TRIMESTER: Primary | ICD-10-CM

## 2018-02-08 DIAGNOSIS — O35.9XX0 FETAL ABNORMALITY AFFECTING MANAGEMENT OF MOTHER, SINGLE OR UNSPECIFIED FETUS: ICD-10-CM

## 2018-02-08 DIAGNOSIS — Q33.0 CONGENITAL PULMONARY AIRWAY MALFORMATION (CPAM): ICD-10-CM

## 2018-02-08 DIAGNOSIS — Q33.0 CONGENITAL PULMONARY AIRWAY MALFORMATION (CPAM): Primary | ICD-10-CM

## 2018-02-08 DIAGNOSIS — Z3A.37 37 WEEKS GESTATION OF PREGNANCY: ICD-10-CM

## 2018-02-08 DIAGNOSIS — O35.CXX0 PREGNANCY COMPLICATED BY FETAL LUNG LESION, SINGLE OR UNSPECIFIED FETUS: ICD-10-CM

## 2018-02-08 DIAGNOSIS — O35.9XX0 SUSPECTED FETAL ABNORMALITY AFFECTING MANAGEMENT OF MOTHER, ANTEPARTUM, SINGLE OR UNSPECIFIED FETUS: ICD-10-CM

## 2018-02-08 PROCEDURE — 76819 FETAL BIOPHYS PROFIL W/O NST: CPT | Performed by: OBSTETRICS & GYNECOLOGY

## 2018-02-08 PROCEDURE — 76816 OB US FOLLOW-UP PER FETUS: CPT

## 2018-02-08 PROCEDURE — 99207 ZZC PRENATAL VISIT: CPT | Performed by: OBSTETRICS & GYNECOLOGY

## 2018-02-08 NOTE — PROGRESS NOTES
Doing well.  Has u/s for EFW today.  BPPs normal.  Baby active.  Still can't reach cervix.  Induce 39 weeks unless poor growth.

## 2018-02-08 NOTE — MR AVS SNAPSHOT
After Visit Summary   2/8/2018    Guerita SILVA    MRN: [id number]           Patient Information     Date Of Birth          1990        Visit Information        Provider Department      2/8/2018 11:30 AM Lazara Brumfield DO [facility] Maternal Fetal Medicine [facility]        Today's Diagnoses     Congenital pulmonary airway malformation (CPAM)    -  1    Suspected fetal abnormality affecting management of mother, antepartum, single or unspecified fetus        Fetal abnormality affecting management of mother, single or unspecified fetus           Follow-ups after your visit        Your next 10 appointments already scheduled     Feb 12, 2018 11:15 AM CST   (Arrive by 11:00 AM)   AFSHIN BPP SINGLE with SHMFMUSR1   [facility] ([facility])    [address] [place] [address]   [phone]            Feb 12, 2018 11:45 AM CST   Radiology MD with RADHA PEPE MD   [facility] Maternal Fetal Medicine [facility])    [address] [place] [id number] [phone]           Please arrive at the time given for your first appointment. This visit is used internally to schedule the physician's time during your ultrasound.            Feb 15, 2018 10:20 AM CST   ESTABLISHED PRENATAL with Rafael Carrasco MD   [facility] [place] ([facility] [place])    [address] [place] [address] [address]  [facility] [address]   [phone]            Feb 15, 2018 11:15 AM CST   (Arrive by 11:00 AM)   AFSHIN US COMPRE SINGLE F/U with SHMFMUSR1   [facility] M[facility]etal [facility] Ultrasound [facility] ([facility])    [address] [place] [address] [phone]           Wear comfortable clothes and leave your valuables at home.            Feb 15, 2018 11:45 AM CST   Radiology MD with RADHA PEPE MD   [facility] Maternal Fetal Medicine  [facility] ([facility])    [address] [id number] [phone]           Please arrive at the time given for your first appointment. This visit is used internally to schedule the physician's time during your ultrasound.              Future tests that were ordered for you today     Open Future Orders        Priority Expected Expires Ordered    MFM BPP Single Routine 2/12/2018 12/8/2018 2/8/2018    MFM US Comprehensive Single F/U Routine 2/15/2018 2/8/2019 2/8/2018            Who to contact     If you have questions or need follow up information about today's clinic visit or your schedule please contact [facility] MATERNAL FETAL MEDICINE [facility] directly at [phone].  Normal or non-critical lab and imaging results will be communicated to you by MyChart, letter or phone within 4 business days after the clinic has received the results. If you do not hear from us within 7 days, please contact the clinic through MyChart or phone. If you have a critical or abnormal lab result, we will notify you by phone as soon as possible.  Submit refill requests through Armor5 or call your pharmacy and they will forward the refill request to us. Please allow 3 business days for your refill to be completed.          Additional Information About Your Visit        ReflektionharWorkube Information     Armor5 gives you secure access to your electronic health record. If you see a primary care provider, you can also send messages to your care team and make appointments. If you have questions, please call your primary care clinic.  If you do not have a primary care provider, please call [phone] and they will assist you.        Care EveryWhere ID     This is your Care EveryWhere ID. This could be used by other organizations to access your [place] medical records  [id number]        Your Vitals Were     Last Period                   05/02/2017            Blood Pressure from Last 3 Encounters:    02/08/18 132/70   02/01/18 120/80   01/18/18 110/62    Weight from Last 3 Encounters:   02/08/18 90.3 kg (199 lb)   02/01/18 90.5 kg (199 lb 9.6 oz)   01/18/18 88.1 kg (194 lb 3.2 oz)               Primary Care Provider Office Phone # Fax #    Rafael Carrasco [phone] [address] [facility] [address]  [facility] [address]        Equal Access to Services     [facility] FarmKRISTINA : Hadii aad ku hadasho Soomaali, waaxda luqadaha, qaybta kaalmada adeegyada, waxdante de la fuente hayhoang hylton . So [facility] [phone].    ATENCIÓN: Si habla español, tiene a tang disposición servicios gratuitos de asistencia lingüística. Llame al [phone].    We comply with applicable federal civil rights laws and [place] laws. We do not discriminate on the basis of race, color, national origin, age, disability, sex, sexual orientation, or gender identity.            Thank you!     Thank you for choosing [facility]  for your care. Our goal is always to provide you with excellent care. Hearing back from our patients is one way we can continue to improve our services. Please take a few minutes to complete the written survey that you may receive in the mail after your visit with us. Thank you!             Your Updated Medication List - Protect others around you: Learn how to safely use, store and throw away your medicines at www.disposemymeds.org.          This list is accurate as of 2/8/18 12:27 PM.  Always use your most recent med list.                   Brand Name Dispense Instructions for use Diagnosis    MULTIVITAMIN & MINERAL PO

## 2018-02-08 NOTE — MR AVS SNAPSHOT
After Visit Summary   2/8/2018    Guerita SILVA    MRN: [id number]           Patient Information     Date Of Birth          1990        Visit Information        Provider Department      2/8/2018 10:20 AM Rafael Carrasco MD [facility] [place]        Today's Diagnoses     Supervision of normal first pregnancy in third trimester    -  1    Pregnancy complicated by fetal lung lesion, single or unspecified fetus        37 weeks gestation of pregnancy           Follow-ups after your visit        Your next 10 appointments already scheduled     Feb 08, 2018 11:30 AM CST   Radiology MD with SH AFSHIN ROSE   [facility] Maternal Fetal Medicine [facility])    [address]  [place] [address] [phone]           Please arrive at the time given for your first appointment. This visit is used internally to schedule the physician's time during your ultrasound.            Feb 15, 2018 10:20 AM CST   ESTABLISHED PRENATAL with Rafael Carrasco MD   [facility] [place] ([facility] [place])    [address]  [place] [address] [phone]              Who to contact     If you have questions or need follow up information about today's clinic visit or your schedule please contact [facility] [place] directly at [phone].  Normal or non-critical lab and imaging results will be communicated to you by MyChart, letter or phone within 4 business days after the clinic has received the results. If you do not hear from us within 7 days, please contact the clinic through MyChart or phone. If you have a critical or abnormal lab result, we will notify you by phone as soon as possible.  Submit refill requests through [employer] or call your pharmacy and they will forward the refill request to us. Please allow 3 business days for your refill to be completed.          Additional Information  About Your Visit        Social Collectivehart Information     [employer] gives you secure access to your electronic health record. If you see a primary care provider, you can also send messages to your care team and make appointments. If you have questions, please call your primary care clinic.  If you do not have a primary care provider, please call [phone] and they will assist you.        Care EveryWhere ID     This is your Care EveryWhere ID. This could be used by other organizations to access your [place] medical records  [id number]        Your Vitals Were     Last Period BMI (Body Mass Index)                05/02/2017 37.6 kg/m2           Blood Pressure from Last 3 Encounters:   02/08/18 132/70   02/01/18 120/80   01/18/18 110/62    Weight from Last 3 Encounters:   02/08/18 199 lb (90.3 kg)   02/01/18 199 lb 9.6 oz (90.5 kg)   01/18/18 194 lb 3.2 oz (88.1 kg)              Today, you had the following     No orders found for display       Primary Care Provider Office Phone # Fax #    Rafael Carrasco [phone] [address] [facility] [facility] [address]  [facility] [address]        Equal Access to Services     JUANITO PEDRAZA AH: Hadii jose colemano Sochelita, waaxda luqadaha, qaybta kaalmada adeegyada, alina carpenter. So [facility] [phone].    ATENCIÓN: Si habla español, tiene a tang disposición servicios gratuitos de asistencia lingüística. Emmy al [phone].    We comply with applicable federal civil rights laws and [place] laws. We do not discriminate on the basis of race, color, national origin, age, disability, sex, sexual orientation, or gender identity.            Thank you!     Thank you for choosing [facility] [place]  for your care. Our goal is always to provide you with excellent care. Hearing back from our patients is one way we can continue to improve our services. Please take a few minutes to complete the written survey that you may receive in the mail after your  visit with us. Thank you!             Your Updated Medication List - Protect others around you: Learn how to safely use, store and throw away your medicines at www.disposemymeds.org.          This list is accurate as of 2/8/18 11:04 AM.  Always use your most recent med list.                   Brand Name Dispense Instructions for use Diagnosis    MULTIVITAMIN & MINERAL PO

## 2018-02-12 ENCOUNTER — NURSE TRIAGE (OUTPATIENT)
Dept: NURSING | Facility: CLINIC | Age: 28
End: 2018-02-12

## 2018-02-12 ENCOUNTER — TELEPHONE (OUTPATIENT)
Dept: OBGYN | Facility: CLINIC | Age: 28
End: 2018-02-12

## 2018-02-12 ENCOUNTER — HOSPITAL ENCOUNTER (OUTPATIENT)
Dept: ULTRASOUND IMAGING | Facility: CLINIC | Age: 28
Discharge: HOME | End: 2018-02-12
Attending: OBSTETRICS & GYNECOLOGY | Admitting: OBSTETRICS & GYNECOLOGY
Payer: COMMERCIAL

## 2018-02-12 ENCOUNTER — OFFICE VISIT (OUTPATIENT)
Dept: MATERNAL FETAL MEDICINE | Facility: CLINIC | Age: 28
End: 2018-02-12
Attending: OBSTETRICS & GYNECOLOGY
Payer: COMMERCIAL

## 2018-02-12 DIAGNOSIS — Q33.0 CONGENITAL PULMONARY AIRWAY MALFORMATION (CPAM): Primary | ICD-10-CM

## 2018-02-12 DIAGNOSIS — O35.9XX0 SUSPECTED FETAL ABNORMALITY AFFECTING MANAGEMENT OF MOTHER, ANTEPARTUM, SINGLE OR UNSPECIFIED FETUS: ICD-10-CM

## 2018-02-12 PROCEDURE — 76819 FETAL BIOPHYS PROFIL W/O NST: CPT

## 2018-02-12 NOTE — MR AVS SNAPSHOT
After Visit Summary   2/12/2018    Guerita SILVA    MRN: [id number]           Patient Information     Date Of Birth          1990        Visit Information        Provider Department      2/12/2018 11:45 AM Cande Joseph MD [facility] Maternal Fetal Medicine [facility]        Today's Diagnoses     Congenital pulmonary airway malformation (CPAM)    -  1    Suspected fetal abnormality affecting management of mother, antepartum, single or unspecified fetus           Follow-ups after your visit        Your next 10 appointments already scheduled     Feb 15, 2018 10:20 AM CST   ESTABLISHED PRENATAL with Rafael Carrasco MD   [facility] [place] ([facility])    [address]  [facility] [address] [phone]            Feb 15, 2018 11:15 AM CST   (Arrive by 11:00 AM)   MFM US COMPRE SINGLE F/U with SHMFMUSR1   [facility] Maternal Fetal Medicine Ultrasound - [facility] ([facility])    [address] [place] [address]  [facility] [id number] [phone]           Wear comfortable clothes and leave your valuables at home.            Feb 15, 2018 11:45 AM CST   Radiology MD with  AFSHIN ROSE   [facility] Maternal Fetal Medicine - [facility] ([facility])    [address] [place] [address]  [facility] [id number] [phone]           Please arrive at the time given for your first appointment. This visit is used internally to schedule the physician's time during your ultrasound.              Who to contact     If you have questions or need follow up information about today's clinic visit or your schedule please contact [facility] FETAL [facility] directly at [phone].  Normal or non-critical lab and imaging results will be communicated to you by MyChart, letter or phone within 4 business days after the clinic has received the results. If you do not hear from us within 7 days, please contact  the clinic through DeliveryEdget or phone. If you have a critical or abnormal lab result, we will notify you by phone as soon as possible.  Submit refill requests through Eyegroove or call your pharmacy and they will forward the refill request to us. Please allow 3 business days for your refill to be completed.          Additional Information About Your Visit        Cookapphart Information     Eyegroove gives you secure access to your electronic health record. If you see a primary care provider, you can also send messages to your care team and make appointments. If you have questions, please call your primary care clinic.  If you do not have a primary care provider, please call [phone] and they will assist you.        Care EveryWhere ID     This is your Care EveryWhere ID. This could be used by other organizations to access your [place] medical records  [id number]        Your Vitals Were     Last Period                   05/02/2017            Blood Pressure from Last 3 Encounters:   02/08/18 132/70   02/01/18 120/80   01/18/18 110/62    Weight from Last 3 Encounters:   02/08/18 90.3 kg (199 lb)   02/01/18 90.5 kg (199 lb 9.6 oz)   01/18/18 88.1 kg (194 lb 3.2 oz)              Today, you had the following     No orders found for display       Primary Care Provider Office Phone # Fax #    Rafael Carrasco [phone] [address]  [place] [address]        Equal Access to Services     [facility] Hospital[facility]RISTINA : Hadii aad ku hadasho Soomaali, waaxda luqadaha, qaybta kaalmada adeegyada, alina hylton . So [facility] [place] [phone].    ATENCIÓN: Si habla español, tiene a tang disposición servicios gratuitos de asistencia lingüística. Llame al [phone].    We comply with applicable federal civil rights laws and [place] laws. We do not discriminate on the basis of race, color, national origin, age, disability, sex, sexual orientation, or gender identity.            Thank you!      Thank you for choosing [facility]  for your care. Our goal is always to provide you with excellent care. Hearing back from our patients is one way we can continue to improve our services. Please take a few minutes to complete the written survey that you may receive in the mail after your visit with us. Thank you!             Your Updated Medication List - Protect others around you: Learn how to safely use, store and throw away your medicines at www.disposemymeds.org.          This list is accurate as of 2/12/18  1:14 PM.  Always use your most recent med list.                   Brand Name Dispense Instructions for use Diagnosis    MULTIVITAMIN & MINERAL PO

## 2018-02-12 NOTE — TELEPHONE ENCOUNTER
Patient is calling to schedule an induction of labor next week. Was told that the clinics close at 5PM and her call rolled to FNA. Patient was agreeable with calling the clinic tomorrow AM, after it opens, to address her issue.  Aviva Diop RN/FNA

## 2018-02-12 NOTE — PROGRESS NOTES
Please see ultrasound report under imaging tab for details on ultrasound performed today.    Cande Joseph MD  , OB/GYN  Maternal-Fetal Medicine  [email or web address]  [phone] (Academic office)  [phone] (Pager)

## 2018-02-13 NOTE — TELEPHONE ENCOUNTER
Left message for pt regarding induction next Tuesday. Called [facility] L&D to schedule induction.

## 2018-02-13 NOTE — TELEPHONE ENCOUNTER
We can plan for next Tuesday but due to unfavorable (unreachable) cervix may need ripening on Monday night. Please set up for Tuesday induction and I will decide about Monday night at next appt.

## 2018-02-13 NOTE — TELEPHONE ENCOUNTER
Pt calling back stating that she went and saw the specialist (AFSHIN) and they indicated to go ahead and schedule the induction at 39 wks. Pt is currently 38w1d. Routing to Dr. Carrasco to review and advise.

## 2018-02-15 ENCOUNTER — PRENATAL OFFICE VISIT (OUTPATIENT)
Dept: OBGYN | Facility: CLINIC | Age: 28
End: 2018-02-15
Payer: COMMERCIAL

## 2018-02-15 ENCOUNTER — OFFICE VISIT (OUTPATIENT)
Dept: MATERNAL FETAL MEDICINE | Facility: CLINIC | Age: 28
End: 2018-02-15
Attending: OBSTETRICS & GYNECOLOGY
Payer: COMMERCIAL

## 2018-02-15 ENCOUNTER — HOSPITAL ENCOUNTER (OUTPATIENT)
Dept: ULTRASOUND IMAGING | Facility: CLINIC | Age: 28
Discharge: HOME | End: 2018-02-15
Attending: OBSTETRICS & GYNECOLOGY | Admitting: OBSTETRICS & GYNECOLOGY
Payer: COMMERCIAL

## 2018-02-15 VITALS — BODY MASS INDEX: 37.71 KG/M2 | SYSTOLIC BLOOD PRESSURE: 100 MMHG | WEIGHT: 199.6 LBS | DIASTOLIC BLOOD PRESSURE: 80 MMHG

## 2018-02-15 DIAGNOSIS — O35.CXX0 PREGNANCY COMPLICATED BY FETAL LUNG LESION, SINGLE OR UNSPECIFIED FETUS: Primary | ICD-10-CM

## 2018-02-15 DIAGNOSIS — O35.9XX0 SUSPECTED FETAL ABNORMALITY AFFECTING MANAGEMENT OF MOTHER, ANTEPARTUM, SINGLE OR UNSPECIFIED FETUS: ICD-10-CM

## 2018-02-15 DIAGNOSIS — Z34.02 ENCOUNTER FOR SUPERVISION OF NORMAL FIRST PREGNANCY IN SECOND TRIMESTER: ICD-10-CM

## 2018-02-15 PROCEDURE — 76816 OB US FOLLOW-UP PER FETUS: CPT

## 2018-02-15 PROCEDURE — 99207 ZZC PRENATAL VISIT: CPT | Performed by: OBSTETRICS & GYNECOLOGY

## 2018-02-15 PROCEDURE — 76819 FETAL BIOPHYS PROFIL W/O NST: CPT

## 2018-02-15 RX ORDER — OXYCODONE AND ACETAMINOPHEN 5; 325 MG/1; MG/1
1 TABLET ORAL
Status: CANCELLED | OUTPATIENT
Start: 2018-02-15

## 2018-02-15 RX ORDER — SODIUM CHLORIDE, SODIUM LACTATE, POTASSIUM CHLORIDE, CALCIUM CHLORIDE 600; 310; 30; 20 MG/100ML; MG/100ML; MG/100ML; MG/100ML
INJECTION, SOLUTION INTRAVENOUS CONTINUOUS
Status: CANCELLED | OUTPATIENT
Start: 2018-02-15

## 2018-02-15 RX ORDER — METHYLERGONOVINE MALEATE 0.2 MG/ML
200 INJECTION INTRAVENOUS
Status: CANCELLED | OUTPATIENT
Start: 2018-02-15

## 2018-02-15 RX ORDER — CARBOPROST TROMETHAMINE 250 UG/ML
250 INJECTION, SOLUTION INTRAMUSCULAR
Status: CANCELLED | OUTPATIENT
Start: 2018-02-15

## 2018-02-15 RX ORDER — NALOXONE HYDROCHLORIDE 0.4 MG/ML
.1-.4 INJECTION, SOLUTION INTRAMUSCULAR; INTRAVENOUS; SUBCUTANEOUS
Status: CANCELLED | OUTPATIENT
Start: 2018-02-15

## 2018-02-15 RX ORDER — IBUPROFEN 400 MG/1
800 TABLET, FILM COATED ORAL
Status: CANCELLED | OUTPATIENT
Start: 2018-02-15

## 2018-02-15 RX ORDER — ONDANSETRON 2 MG/ML
4 INJECTION INTRAMUSCULAR; INTRAVENOUS EVERY 6 HOURS PRN
Status: CANCELLED | OUTPATIENT
Start: 2018-02-15

## 2018-02-15 RX ORDER — MISOPROSTOL 100 UG/1
25 TABLET ORAL EVERY 4 HOURS PRN
Status: CANCELLED | OUTPATIENT
Start: 2018-02-15

## 2018-02-15 RX ORDER — OXYTOCIN 10 [USP'U]/ML
10 INJECTION, SOLUTION INTRAMUSCULAR; INTRAVENOUS
Status: CANCELLED | OUTPATIENT
Start: 2018-02-15

## 2018-02-15 RX ORDER — ACETAMINOPHEN 325 MG/1
650 TABLET ORAL EVERY 4 HOURS PRN
Status: CANCELLED | OUTPATIENT
Start: 2018-02-15

## 2018-02-15 RX ORDER — OXYTOCIN/0.9 % SODIUM CHLORIDE 30/500 ML
100-340 PLASTIC BAG, INJECTION (ML) INTRAVENOUS CONTINUOUS PRN
Status: CANCELLED | OUTPATIENT
Start: 2018-02-15

## 2018-02-15 NOTE — MR AVS SNAPSHOT
After Visit Summary   2/15/2018    Guerita SILVA    MRN: [id number]           Patient Information     Date Of Birth          1990        Visit Information        Provider Department      2/15/2018 10:20 AM Rafael Carrasco MD [facility]        Today's Diagnoses     Encounter for supervision of normal first pregnancy in second trimester           Follow-ups after your visit        Who to contact     If you have questions or need follow up information about today's clinic visit or your schedule please contact [facility] directly at [phone].  Normal or non-critical lab and imaging results will be communicated to you by Sportsgrithart, letter or phone within 4 business days after the clinic has received the results. If you do not hear from us within 7 days, please contact the clinic through Manicubet or phone. If you have a critical or abnormal lab result, we will notify you by phone as soon as possible.  Submit refill requests through [employer] or call your pharmacy and they will forward the refill request to us. Please allow 3 business days for your refill to be completed.          Additional Information About Your Visit        MyChart Information     [employer] gives you secure access to your electronic health record. If you see a primary care provider, you can also send messages to your care team and make appointments. If you have questions, please call your primary care clinic.  If you do not have a primary care provider, please call [phone] and they will assist you.        Care EveryWhere ID     This is your Care EveryWhere ID. This could be used by other organizations to access your [place] medical records  [id number]        Your Vitals Were     Last Period BMI (Body Mass Index)                05/02/2017 37.71 kg/m2           Blood Pressure from Last 3 Encounters:   02/15/18 100/80   02/08/18 132/70   02/01/18 120/80    Weight from Last 3  Encounters:   02/15/18 199 lb 9.6 oz (90.5 kg)   02/08/18 199 lb (90.3 kg)   02/01/18 199 lb 9.6 oz (90.5 kg)              Today, you had the following     No orders found for display       Primary Care Provider Office Phone # Fax #    Rafael Carrasco [phone] [address] [facility] [address]  [facility] [address]        Equal Access to Services     HUM[facility]ERT[facility] HospitalKRISTINA : Hadii aad ku hadasho Soomaali, waaxda luqadaha, qaybta kaalmada adeegyada, waxay idiin hayaan adeeg kharawalter la'taln . So [facility] [phone].    ATENCIÓN: Si habla español, tiene a tang disposición servicios gratuitos de asistencia lingüística. [facility] [phone].    We comply with applicable federal civil rights laws and [place] laws. We do not discriminate on the basis of race, color, national origin, age, disability, sex, sexual orientation, or gender identity.            Thank you!     Thank you for choosing [facility] [place]  for your care. Our goal is always to provide you with excellent care. Hearing back from our patients is one way we can continue to improve our services. Please take a few minutes to complete the written survey that you may receive in the mail after your visit with us. Thank you!             Your Updated Medication List - Protect others around you: Learn how to safely use, store and throw away your medicines at www.disposemymeds.org.          This list is accurate as of 2/15/18 12:31 PM.  Always use your most recent med list.                   Brand Name Dispense Instructions for use Diagnosis    MULTIVITAMIN & MINERAL PO

## 2018-02-15 NOTE — MR AVS SNAPSHOT
After Visit Summary   2/15/2018    Guerita SILVA    MRN: [id number]           Patient Information     Date Of Birth          1990        Visit Information        Provider Department      2/15/2018 11:45 AM Cnochita Avitia MD [facility] Maternal Fetal Medicine [facility]        Today's Diagnoses     Pregnancy complicated by fetal lung lesion, single or unspecified fetus    -  1       Follow-ups after your visit        Who to contact     If you have questions or need follow up information about today's clinic visit or your schedule please contact [facility] MATERNAL FETAL MEDIC[facility] directly at [phone].  Normal or non-critical lab and imaging results will be communicated to you by GenomOncologyhart, letter or phone within 4 business days after the clinic has received the results. If you do not hear from us within 7 days, please contact the clinic through eTutort or phone. If you have a critical or abnormal lab result, we will notify you by phone as soon as possible.  Submit refill requests through PLAYSTUDIOS or call your pharmacy and they will forward the refill request to us. Please allow 3 business days for your refill to be completed.          Additional Information About Your Visit        MyChart Information     PLAYSTUDIOS gives you secure access to your electronic health record. If you see a primary care provider, you can also send messages to your care team and make appointments. If you have questions, please call your primary care clinic.  If you do not have a primary care provider, please call [phone] and they will assist you.        Care EveryWhere ID     This is your Care EveryWhere ID. This could be used by other organizations to access your [place] medical records  [id number]        Your Vitals Were     Last Period                   05/02/2017            Blood Pressure from Last 3 Encounters:   02/15/18 100/80   02/08/18 132/70   02/01/18 120/80    Weight from Last 3 Encounters:    02/15/18 90.5 kg (199 lb 9.6 oz)   02/08/18 90.3 kg (199 lb)   02/01/18 90.5 kg (199 lb 9.6 oz)              Today, you had the following     No orders found for display       Primary Care Provider Office Phone # Fax #    Rafael Carrasco [phone] [address]  [place] [address]        Equal Access to Services     [facility]: Hadii aad ku hadasho Soomaali, waaxda luqadaha, qaybta kaalmada adeegyada, waxay idiin hayaan adeeg kharawalter la'taln . So [facility] [phone].    ATENCIÓN: Si habla español, tiene a tang disposición servicios gratuitos de asistencia lingüística. Llame al [phone].    We comply with applicable federal civil rights laws and [place] laws. We do not discriminate on the basis of race, color, national origin, age, disability, sex, sexual orientation, or gender identity.            Thank you!     Thank you for choosing [facility]  for your care. Our goal is always to provide you with excellent care. Hearing back from our patients is one way we can continue to improve our services. Please take a few minutes to complete the written survey that you may receive in the mail after your visit with us. Thank you!             Your Updated Medication List - Protect others around you: Learn how to safely use, store and throw away your medicines at www.disposemymeds.org.          This list is accurate as of 2/15/18  1:29 PM.  Always use your most recent med list.                   Brand Name Dispense Instructions for use Diagnosis    MULTIVITAMIN & MINERAL PO

## 2018-02-15 NOTE — PROGRESS NOTES
No problems.   Baby active.  Plans induction next week.  Cx finally reached mid position. Some effacement and soft. VTX high.  Will come 2/19 for cytotec and induction 2/20.  NNP at delivery in case there are breathing issues.   See Peds surgery in 3months PP

## 2018-02-15 NOTE — PROGRESS NOTES
"Please see \"Imaging\" tab under \"Chart Review\" for details of today's visit.    Conchita Avitia    "

## 2018-02-19 ENCOUNTER — ANESTHESIA (OUTPATIENT)
Dept: OBGYN | Facility: CLINIC | Age: 28
End: 2018-02-19
Payer: COMMERCIAL

## 2018-02-19 ENCOUNTER — HOSPITAL ENCOUNTER (INPATIENT)
Facility: CLINIC | Age: 28
LOS: 2 days | Discharge: HOME | End: 2018-02-21
Attending: OBSTETRICS & GYNECOLOGY | Admitting: OBSTETRICS & GYNECOLOGY
Payer: COMMERCIAL

## 2018-02-19 ENCOUNTER — ANESTHESIA EVENT (OUTPATIENT)
Dept: OBGYN | Facility: CLINIC | Age: 28
End: 2018-02-19
Payer: COMMERCIAL

## 2018-02-19 LAB
ABO + RH BLD: NORMAL
ABO + RH BLD: NORMAL
BLD GP AB SCN SERPL QL: NORMAL
BLOOD BANK CMNT PATIENT-IMP: NORMAL
HGB BLD-MCNC: 11.7 G/DL (ref 11.7–15.7)
SPECIMEN EXP DATE BLD: NORMAL
T PALLIDUM IGG+IGM SER QL: NEGATIVE

## 2018-02-19 PROCEDURE — 37000011 ZZH ANESTHESIA WARD SERVICE

## 2018-02-19 PROCEDURE — 25000128 H RX IP 250 OP 636: Performed by: ANESTHESIOLOGY

## 2018-02-19 PROCEDURE — 25000125 ZZHC RX 250: Performed by: ANESTHESIOLOGY

## 2018-02-19 PROCEDURE — 25000132 ZZH RX MED GY IP 250 OP 250 PS 637: Performed by: OBSTETRICS & GYNECOLOGY

## 2018-02-19 PROCEDURE — 85018 HEMOGLOBIN: CPT | Performed by: OBSTETRICS & GYNECOLOGY

## 2018-02-19 PROCEDURE — 86780 TREPONEMA PALLIDUM: CPT | Performed by: OBSTETRICS & GYNECOLOGY

## 2018-02-19 PROCEDURE — 27110038 ZZH RX 271: Performed by: ANESTHESIOLOGY

## 2018-02-19 PROCEDURE — 59400 OBSTETRICAL CARE: CPT | Performed by: OBSTETRICS & GYNECOLOGY

## 2018-02-19 PROCEDURE — 0KQM0ZZ REPAIR PERINEUM MUSCLE, OPEN APPROACH: ICD-10-PCS | Performed by: OBSTETRICS & GYNECOLOGY

## 2018-02-19 PROCEDURE — 25000125 ZZHC RX 250: Performed by: OBSTETRICS & GYNECOLOGY

## 2018-02-19 PROCEDURE — 12000037 ZZH R&B POSTPARTUM INTERMEDIATE

## 2018-02-19 PROCEDURE — 86900 BLOOD TYPING SEROLOGIC ABO: CPT | Performed by: OBSTETRICS & GYNECOLOGY

## 2018-02-19 PROCEDURE — 86901 BLOOD TYPING SEROLOGIC RH(D): CPT | Performed by: OBSTETRICS & GYNECOLOGY

## 2018-02-19 PROCEDURE — 00HU33Z INSERTION OF INFUSION DEVICE INTO SPINAL CANAL, PERCUTANEOUS APPROACH: ICD-10-PCS | Performed by: ANESTHESIOLOGY

## 2018-02-19 PROCEDURE — 3E0R3BZ INTRODUCTION OF ANESTHETIC AGENT INTO SPINAL CANAL, PERCUTANEOUS APPROACH: ICD-10-PCS | Performed by: ANESTHESIOLOGY

## 2018-02-19 PROCEDURE — 25000128 H RX IP 250 OP 636: Performed by: OBSTETRICS & GYNECOLOGY

## 2018-02-19 PROCEDURE — 86850 RBC ANTIBODY SCREEN: CPT | Performed by: OBSTETRICS & GYNECOLOGY

## 2018-02-19 PROCEDURE — G0463 HOSPITAL OUTPT CLINIC VISIT: HCPCS | Mod: 25

## 2018-02-19 PROCEDURE — 59025 FETAL NON-STRESS TEST: CPT

## 2018-02-19 PROCEDURE — 36415 COLL VENOUS BLD VENIPUNCTURE: CPT | Performed by: OBSTETRICS & GYNECOLOGY

## 2018-02-19 PROCEDURE — 72200001 ZZH LABOR CARE VAGINAL DELIVERY SINGLE

## 2018-02-19 RX ORDER — ACETAMINOPHEN 325 MG/1
650 TABLET ORAL EVERY 4 HOURS PRN
Status: DISCONTINUED | OUTPATIENT
Start: 2018-02-19 | End: 2018-02-21 | Stop reason: HOSPADM

## 2018-02-19 RX ORDER — NALBUPHINE HYDROCHLORIDE 10 MG/ML
2.5-5 INJECTION, SOLUTION INTRAMUSCULAR; INTRAVENOUS; SUBCUTANEOUS EVERY 6 HOURS PRN
Status: DISCONTINUED | OUTPATIENT
Start: 2018-02-19 | End: 2018-02-19

## 2018-02-19 RX ORDER — IBUPROFEN 400 MG/1
800 TABLET, FILM COATED ORAL EVERY 6 HOURS PRN
Status: DISCONTINUED | OUTPATIENT
Start: 2018-02-19 | End: 2018-02-21 | Stop reason: HOSPADM

## 2018-02-19 RX ORDER — OXYTOCIN/0.9 % SODIUM CHLORIDE 30/500 ML
100-340 PLASTIC BAG, INJECTION (ML) INTRAVENOUS CONTINUOUS PRN
Status: COMPLETED | OUTPATIENT
Start: 2018-02-19 | End: 2018-02-19

## 2018-02-19 RX ORDER — NALBUPHINE HYDROCHLORIDE 10 MG/ML
10-20 INJECTION, SOLUTION INTRAMUSCULAR; INTRAVENOUS; SUBCUTANEOUS
Status: DISCONTINUED | OUTPATIENT
Start: 2018-02-19 | End: 2018-02-19

## 2018-02-19 RX ORDER — CARBOPROST TROMETHAMINE 250 UG/ML
250 INJECTION, SOLUTION INTRAMUSCULAR
Status: DISCONTINUED | OUTPATIENT
Start: 2018-02-19 | End: 2018-02-19

## 2018-02-19 RX ORDER — IBUPROFEN 400 MG/1
800 TABLET, FILM COATED ORAL
Status: DISCONTINUED | OUTPATIENT
Start: 2018-02-19 | End: 2018-02-19

## 2018-02-19 RX ORDER — ROPIVACAINE HYDROCHLORIDE 2 MG/ML
10 INJECTION, SOLUTION EPIDURAL; INFILTRATION; PERINEURAL ONCE
Status: COMPLETED | OUTPATIENT
Start: 2018-02-19 | End: 2018-02-19

## 2018-02-19 RX ORDER — FENTANYL CITRATE 50 UG/ML
100 INJECTION, SOLUTION INTRAMUSCULAR; INTRAVENOUS ONCE
Status: COMPLETED | OUTPATIENT
Start: 2018-02-19 | End: 2018-02-19

## 2018-02-19 RX ORDER — HYDROCORTISONE 2.5 %
CREAM (GRAM) TOPICAL 3 TIMES DAILY PRN
Status: DISCONTINUED | OUTPATIENT
Start: 2018-02-19 | End: 2018-02-21 | Stop reason: HOSPADM

## 2018-02-19 RX ORDER — NALOXONE HYDROCHLORIDE 0.4 MG/ML
.1-.4 INJECTION, SOLUTION INTRAMUSCULAR; INTRAVENOUS; SUBCUTANEOUS
Status: DISCONTINUED | OUTPATIENT
Start: 2018-02-19 | End: 2018-02-19

## 2018-02-19 RX ORDER — OXYTOCIN/0.9 % SODIUM CHLORIDE 30/500 ML
100 PLASTIC BAG, INJECTION (ML) INTRAVENOUS CONTINUOUS
Status: DISCONTINUED | OUTPATIENT
Start: 2018-02-19 | End: 2018-02-21 | Stop reason: HOSPADM

## 2018-02-19 RX ORDER — AMOXICILLIN 250 MG
2 CAPSULE ORAL 2 TIMES DAILY PRN
Status: DISCONTINUED | OUTPATIENT
Start: 2018-02-19 | End: 2018-02-21 | Stop reason: HOSPADM

## 2018-02-19 RX ORDER — ACETAMINOPHEN 325 MG/1
650 TABLET ORAL EVERY 4 HOURS PRN
Status: DISCONTINUED | OUTPATIENT
Start: 2018-02-19 | End: 2018-02-19

## 2018-02-19 RX ORDER — SODIUM CHLORIDE, SODIUM LACTATE, POTASSIUM CHLORIDE, CALCIUM CHLORIDE 600; 310; 30; 20 MG/100ML; MG/100ML; MG/100ML; MG/100ML
INJECTION, SOLUTION INTRAVENOUS CONTINUOUS
Status: DISCONTINUED | OUTPATIENT
Start: 2018-02-19 | End: 2018-02-19

## 2018-02-19 RX ORDER — OXYTOCIN/0.9 % SODIUM CHLORIDE 30/500 ML
340 PLASTIC BAG, INJECTION (ML) INTRAVENOUS CONTINUOUS PRN
Status: DISCONTINUED | OUTPATIENT
Start: 2018-02-19 | End: 2018-02-21 | Stop reason: HOSPADM

## 2018-02-19 RX ORDER — EPHEDRINE SULFATE 50 MG/ML
5 INJECTION, SOLUTION INTRAMUSCULAR; INTRAVENOUS; SUBCUTANEOUS
Status: DISCONTINUED | OUTPATIENT
Start: 2018-02-19 | End: 2018-02-19

## 2018-02-19 RX ORDER — ONDANSETRON 2 MG/ML
4 INJECTION INTRAMUSCULAR; INTRAVENOUS EVERY 6 HOURS PRN
Status: DISCONTINUED | OUTPATIENT
Start: 2018-02-19 | End: 2018-02-19

## 2018-02-19 RX ORDER — OXYTOCIN/0.9 % SODIUM CHLORIDE 30/500 ML
1-24 PLASTIC BAG, INJECTION (ML) INTRAVENOUS CONTINUOUS
Status: DISCONTINUED | OUTPATIENT
Start: 2018-02-19 | End: 2018-02-19

## 2018-02-19 RX ORDER — OXYCODONE AND ACETAMINOPHEN 5; 325 MG/1; MG/1
1 TABLET ORAL
Status: DISCONTINUED | OUTPATIENT
Start: 2018-02-19 | End: 2018-02-19

## 2018-02-19 RX ORDER — TERBUTALINE SULFATE 1 MG/ML
0.25 INJECTION, SOLUTION SUBCUTANEOUS
Status: DISCONTINUED | OUTPATIENT
Start: 2018-02-19 | End: 2018-02-19

## 2018-02-19 RX ORDER — LIDOCAINE 40 MG/G
CREAM TOPICAL
Status: DISCONTINUED | OUTPATIENT
Start: 2018-02-19 | End: 2018-02-19

## 2018-02-19 RX ORDER — FENTANYL CITRATE 50 UG/ML
50-100 INJECTION, SOLUTION INTRAMUSCULAR; INTRAVENOUS
Status: DISCONTINUED | OUTPATIENT
Start: 2018-02-19 | End: 2018-02-19

## 2018-02-19 RX ORDER — LANOLIN 100 %
OINTMENT (GRAM) TOPICAL
Status: DISCONTINUED | OUTPATIENT
Start: 2018-02-19 | End: 2018-02-21 | Stop reason: HOSPADM

## 2018-02-19 RX ORDER — OXYTOCIN 10 [USP'U]/ML
10 INJECTION, SOLUTION INTRAMUSCULAR; INTRAVENOUS
Status: DISCONTINUED | OUTPATIENT
Start: 2018-02-19 | End: 2018-02-21 | Stop reason: HOSPADM

## 2018-02-19 RX ORDER — AMOXICILLIN 250 MG
1 CAPSULE ORAL 2 TIMES DAILY PRN
Status: DISCONTINUED | OUTPATIENT
Start: 2018-02-19 | End: 2018-02-21 | Stop reason: HOSPADM

## 2018-02-19 RX ORDER — OXYTOCIN 10 [USP'U]/ML
10 INJECTION, SOLUTION INTRAMUSCULAR; INTRAVENOUS
Status: DISCONTINUED | OUTPATIENT
Start: 2018-02-19 | End: 2018-02-19

## 2018-02-19 RX ORDER — LIDOCAINE HYDROCHLORIDE AND EPINEPHRINE 15; 5 MG/ML; UG/ML
INJECTION, SOLUTION EPIDURAL PRN
Status: DISCONTINUED | OUTPATIENT
Start: 2018-02-19 | End: 2018-02-20 | Stop reason: HOSPADM

## 2018-02-19 RX ORDER — MISOPROSTOL 200 UG/1
400 TABLET ORAL
Status: DISCONTINUED | OUTPATIENT
Start: 2018-02-19 | End: 2018-02-21 | Stop reason: HOSPADM

## 2018-02-19 RX ORDER — BISACODYL 10 MG
10 SUPPOSITORY, RECTAL RECTAL DAILY PRN
Status: DISCONTINUED | OUTPATIENT
Start: 2018-02-21 | End: 2018-02-21 | Stop reason: HOSPADM

## 2018-02-19 RX ORDER — METHYLERGONOVINE MALEATE 0.2 MG/ML
200 INJECTION INTRAVENOUS
Status: DISCONTINUED | OUTPATIENT
Start: 2018-02-19 | End: 2018-02-19

## 2018-02-19 RX ORDER — NALOXONE HYDROCHLORIDE 0.4 MG/ML
.1-.4 INJECTION, SOLUTION INTRAMUSCULAR; INTRAVENOUS; SUBCUTANEOUS
Status: DISCONTINUED | OUTPATIENT
Start: 2018-02-19 | End: 2018-02-21 | Stop reason: HOSPADM

## 2018-02-19 RX ADMIN — ROPIVACAINE HYDROCHLORIDE 10 ML: 2 INJECTION, SOLUTION EPIDURAL; INFILTRATION at 08:10

## 2018-02-19 RX ADMIN — OXYTOCIN-SODIUM CHLORIDE 0.9% IV SOLN 30 UNIT/500ML 340 ML/HR: 30-0.9/5 SOLUTION at 16:23

## 2018-02-19 RX ADMIN — ONDANSETRON 4 MG: 2 INJECTION INTRAMUSCULAR; INTRAVENOUS at 09:00

## 2018-02-19 RX ADMIN — SODIUM CHLORIDE, POTASSIUM CHLORIDE, SODIUM LACTATE AND CALCIUM CHLORIDE 1000 ML: 600; 310; 30; 20 INJECTION, SOLUTION INTRAVENOUS at 07:13

## 2018-02-19 RX ADMIN — SENNOSIDES AND DOCUSATE SODIUM 1 TABLET: 8.6; 5 TABLET ORAL at 20:14

## 2018-02-19 RX ADMIN — FENTANYL CITRATE 100 MCG: 50 INJECTION, SOLUTION INTRAMUSCULAR; INTRAVENOUS at 08:10

## 2018-02-19 RX ADMIN — SODIUM CHLORIDE, POTASSIUM CHLORIDE, SODIUM LACTATE AND CALCIUM CHLORIDE: 600; 310; 30; 20 INJECTION, SOLUTION INTRAVENOUS at 05:52

## 2018-02-19 RX ADMIN — Medication 12 ML/HR: at 08:29

## 2018-02-19 RX ADMIN — OXYTOCIN-SODIUM CHLORIDE 0.9% IV SOLN 30 UNIT/500ML 2 MILLI-UNITS/MIN: 30-0.9/5 SOLUTION at 08:22

## 2018-02-19 RX ADMIN — ACETAMINOPHEN 650 MG: 325 TABLET, FILM COATED ORAL at 18:26

## 2018-02-19 RX ADMIN — LIDOCAINE HYDROCHLORIDE AND EPINEPHRINE 3 ML: 15; 5 INJECTION, SOLUTION EPIDURAL at 08:08

## 2018-02-19 RX ADMIN — IBUPROFEN 800 MG: 400 TABLET ORAL at 18:26

## 2018-02-19 RX ADMIN — FENTANYL CITRATE 100 MCG: 50 INJECTION, SOLUTION INTRAMUSCULAR; INTRAVENOUS at 06:05

## 2018-02-19 RX ADMIN — SODIUM CHLORIDE, POTASSIUM CHLORIDE, SODIUM LACTATE AND CALCIUM CHLORIDE 1000 ML: 600; 310; 30; 20 INJECTION, SOLUTION INTRAVENOUS at 10:53

## 2018-02-19 NOTE — PROGRESS NOTES
of viable female at 1630   Baby's weight lbs. oz.     Apgars 8, 9     Velamentous insertion of cord   Nuchal loop X2   Thin cord    Primary OB: [facility]

## 2018-02-19 NOTE — PLAN OF CARE
Dr. Carrasco paged with call returned. Updated regarding, but not limited to, patient comfortable with epidural, SVE 4/80/-2, anterior position, oxytocin infusing, SROM after epidural, clear fluid. Will continue to monitor.

## 2018-02-19 NOTE — PLAN OF CARE
Dr. Gore at bedside. SVE done by MD and AROM attempted, most likely unsuccessful per MD. Will monitor. Orders received to start pitocin. Patient may have epidural whenever she would like it. Patient requesting epidural at this time. LR bolus started.

## 2018-02-19 NOTE — PLAN OF CARE
Dr. Carrasco paged with call returned. Updated regarding, but not limited to, latest SVE, prolonged decel in the 90s for about 6 minutes, FSE applied, intrauterine resuscitation performed, pitocin off. Recurrent variable decels present with contractions with moderate variability and baseline around 110 BPM.     Plan per provider: He will pull up the strip remotely and notify RN if he has any concerns. Restart pitocin as soon as able. Notify MD if variable decels become deeper or if minimal variability appears.

## 2018-02-19 NOTE — IP AVS SNAPSHOT
MRN:[id number]                      After Visit Summary   2/19/2018    Guerita SILVA    MRN: [id number]           Thank you!     Thank you for choosing [facility] for your care. Our goal is always to provide you with excellent care. Hearing back from our patients is one way we can continue to improve our services. Please take a few minutes to complete the written survey that you may receive in the mail after you visit with us. Thank you!        Patient Information     Date Of Birth          1990        About your hospital stay     You were admitted on:  February 19, 2018 You last received care in the:  [address]    You were discharged on:  February 21, 2018       Who to Call     For medical emergencies, please call 911.  For non-urgent questions about your medical care, please call your primary care provider or clinic, [phone]          Attending Provider     Provider Specialty    Rafael Carrasco MD OB/Gyn       Primary Care Provider Office Phone # Fax #    Rafael Carrasco [phone]      After Care Instructions     Activity       Review discharge instructions            Diet       Resume previous diet            Discharge Instructions - Hypertensive disorders patients       High Blood pressure patients to follow up in clinic or via home care within one week for a blood pressure check            Discharge Instructions - Postpartum visit       Schedule postpartum visit with your provider and return to clinic in 6 weeks.                  Further instructions from your care team       Postpartum Vaginal Delivery Instructions    Activity       Ask family and friends for help when you need it.    Do not place anything in your vagina for 6 weeks.    You are not restricted on other activities, but take it easy for a few weeks to allow your body to recover from delivery.  You are able to do any activities you feel up to that point.    No  driving until you have stopped taking your pain medications (usually two weeks after delivery).     Call your health care provider if you have any of these symptoms:       Increased pain, swelling, redness, or fluid around your stiches from an episiotomy or perineal tear.    A fever above 100.4 F (38 C) with or without chills when placing a thermometer under your tongue.    You soak a sanitary pad with blood within 1 hour, or you see blood clots larger than a golf ball.    Bleeding that lasts more than 6 weeks.    Vaginal discharge that smells bad.    Severe pain, cramping or tenderness in your lower belly area.    A need to urinate more frequently (use the toilet more often), more urgently (use the toilet very quickly), or it burns when you urinate.    Nausea and vomiting.    Redness, swelling or pain around a vein in your leg.    Problems breastfeeding or a red or painful area on your breast.    Chest pain and cough or are gasping for air.    Problems coping with sadness, anxiety, or depression.  If you have any concerns about hurting yourself or the baby, call your provider immediately.     You have questions or concerns after you return home.     Keep your hands clean:  Always wash your hands before touching your perineal area and stitches.  This helps reduce your risk of infection.  If your hands aren't dirty, you may use an alcohol hand-rub to clean your hands. Keep your nails clean and short.        Pending Results     No orders found from 2/17/2018 to 2/20/2018.            Statement of Approval     Ordered          02/21/18 0950  I have reviewed and agree with all the recommendations and orders detailed in this document.  EFFECTIVE NOW     Approved and electronically signed by:  Rafael Carrasco MD             Admission Information     Date & Time Provider Department Dept. Phone    2/19/2018 Rafael Carrasco MD [address] [phone]      Your Vitals Were     Blood  "Pressure Pulse Temperature Respirations Height Weight    125/80 82 97.2  F (36.2  C) (Oral) 16 1.549 m (5' 1\") 90.3 kg (199 lb)    Last Period Pulse Oximetry BMI (Body Mass Index)             05/02/2017 99% 37.6 kg/m2         [employer] Information     [employer] gives you secure access to your electronic health record. If you see a primary care provider, you can also send messages to your care team and make appointments. If you have questions, please call your primary care clinic.  If you do not have a primary care provider, please call [phone] and they will assist you.        Care EveryWhere ID     This is your Care EveryWhere ID. This could be used by other organizations to access your [facility] medical records  [id number]        Equal Access to Services     JUANITO PEDRAZA : uTnde Borrego, scott benedict, bhavin baig, alina carpenter. So [facility] [phone].    ATENCIÓN: Si habla español, tiene a tang disposición servicios gratuitos de asistencia lingüística. [place] [phone].    We comply with applicable federal civil rights laws and [place] laws. We do not discriminate on the basis of race, color, national origin, age, disability, sex, sexual orientation, or gender identity.               Review of your medicines      CONTINUE these medicines which have NOT CHANGED        Dose / Directions    MULTIVITAMIN & MINERAL PO        Refills:  0                Protect others around you: Learn how to safely use, store and throw away your medicines at www.disposemymeds.org.             Medication List: This is a list of all your medications and when to take them. Check marks below indicate your daily home schedule. Keep this list as a reference.      Medications           Morning Afternoon Evening Bedtime As Needed    MULTIVITAMIN & MINERAL PO                                  "

## 2018-02-19 NOTE — IP AVS SNAPSHOT
[address]    [place] [address]    Phone:  [phone]                                       After Visit Summary   2/19/2018    Guerita SILVA    MRN: [id number]           After Visit Summary Signature Page     I have received my discharge instructions, and my questions have been answered. I have discussed any challenges I see with this plan with the nurse or doctor.    ..........................................................................................................................................  Patient/Patient Representative Signature      ..........................................................................................................................................  Patient Representative Print Name and Relationship to Patient    ..................................................               ................................................  Date                                            Time    ..........................................................................................................................................  Reviewed by Signature/Title    ...................................................              ..............................................  Date                                                            Time

## 2018-02-19 NOTE — ANESTHESIA PROCEDURE NOTES
Peripheral nerve/Neuraxial procedure note : epidural catheter  Pre-Procedure  Performed by STEPHANIE BANKS  Location: OB      Pre-Anesthestic Checklist: patient identified, IV checked, site marked, risks and benefits discussed, informed consent, monitors and equipment checked, pre-op evaluation, at physician/surgeon's request and post-op pain management    Timeout  Correct Patient: Yes   Correct Procedure: Yes   Correct Site: Yes   Correct Laterality: Yes   Correct Position: Yes   Site Marked: Yes   .   Procedure Documentation    .    Procedure:    Epidural catheter.  Insertion Site:L3-4  (midline approach) Injection technique: LORT saline   Local skin infiltrated with 3 mL of 1% lidocaine.  VENUS at 8 cm     Patient Prep;mask, sterile gloves, povidone-iodine 7.5% surgical scrub, patient draped.  .  Needle: Touhy needle Needle Gauge: 17.    Needle Length (Inches) 3.5  # of attempts: 1 and  # of redirects:  1 .   Catheter: 19 G . .  Catheter threaded easily  .  12 cm at skin.   .    Assessment/Narrative  Paresthesias: No.  .  .  Aspiration negative for heme or CSF  . Test dose of 3 mL lidocaine 1.5% w/ 1:200,000 epinephrine at. Test dose negative for signs of intravascular, subdural or intrathecal injection. Comments:  No complications   Secured with Tegaderm, adhesive spray and tape

## 2018-02-20 PROCEDURE — 12000037 ZZH R&B POSTPARTUM INTERMEDIATE

## 2018-02-20 PROCEDURE — 25000132 ZZH RX MED GY IP 250 OP 250 PS 637: Performed by: OBSTETRICS & GYNECOLOGY

## 2018-02-20 RX ADMIN — ACETAMINOPHEN 650 MG: 325 TABLET, FILM COATED ORAL at 00:30

## 2018-02-20 RX ADMIN — SENNOSIDES AND DOCUSATE SODIUM 1 TABLET: 8.6; 5 TABLET ORAL at 19:37

## 2018-02-20 RX ADMIN — SENNOSIDES AND DOCUSATE SODIUM 1 TABLET: 8.6; 5 TABLET ORAL at 10:28

## 2018-02-20 RX ADMIN — IBUPROFEN 800 MG: 400 TABLET ORAL at 18:17

## 2018-02-20 RX ADMIN — IBUPROFEN 800 MG: 400 TABLET ORAL at 12:30

## 2018-02-20 RX ADMIN — ACETAMINOPHEN 650 MG: 325 TABLET, FILM COATED ORAL at 14:36

## 2018-02-20 RX ADMIN — IBUPROFEN 800 MG: 400 TABLET ORAL at 00:30

## 2018-02-20 RX ADMIN — IBUPROFEN 800 MG: 400 TABLET ORAL at 06:20

## 2018-02-20 RX ADMIN — ACETAMINOPHEN 650 MG: 325 TABLET, FILM COATED ORAL at 06:20

## 2018-02-20 RX ADMIN — ACETAMINOPHEN 650 MG: 325 TABLET, FILM COATED ORAL at 10:27

## 2018-02-20 RX ADMIN — ACETAMINOPHEN 650 MG: 325 TABLET, FILM COATED ORAL at 18:18

## 2018-02-20 NOTE — PROGRESS NOTES
Pt. admitted from L&D  via wheelchair and transferred to bed with assist of 2. Pt. arrived with baby and was accompanied by her  and arrived with personal belongings. Report was taken from JANNIE Angeles in L&D. VSS. Fundus is firm and midline.  Vaginal bleeding is small.  Oxytocin is infusing.  Pt. oriented to the room and call light system.

## 2018-02-20 NOTE — LACTATION NOTE
Initial Lactation visit.  Recommend unlimited, frequent breast feedings: At least 8 - 12 times every 24 hours. Avoid pacifiers and supplementation with formula unless medically indicated. Explained benefits of holding baby skin on skin to help promote better breastfeeding outcomes.  Infant feeding well with shield.  Guerita had no questions or concerns.  Reviewed normal second night cluster feeding.  Will revisit as needed.    Gabriella Diaz RN, IBCLC

## 2018-02-20 NOTE — L&D DELIVERY NOTE
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  The patient has been followed closely with Maternal Fetal Medicine with growth ultrasounds.  There has been no evidence of fetal congestive heart failure nor mediastinal shift.  She was noted to have 1 or 2 nuchal loops on ultrasound.  The patient was scheduled for induction but came in in spontaneous labor.  She had spontaneous rupture of membranes with clear fluid, received epidural for analgesia.  She then became completely dilated at approximately 3:30 in the afternoon had a second stage of 1 hour and 54 minutes.  She then pushed the fetal vertex over a second-degree laceration in the ROSE position.  There were 2 nuchal loops which were reduced on the perineum.  The rest of the baby delivered onto the maternal abdomen.  Delayed cord clamping was done.  Cord was doubly clamped and cut with a 3-vessel cord noted.  Placenta was adherent to the fundus, there was tearing noted with traction on the cord and manual extraction was performed.  The placenta was small and had velamentous insertion of the cord.  The uterine cavity was explored manually and there were no further tissue left behind.  Uterus contracted well.  The tear was repaired with 3-0 Vicryl in layers in the usual fashion.      FINAL DIAGNOSES:   1.  Intrauterine pregnancy at 39 weeks gestation with delivery of viable female infant with Apgars of 8 and 9.   2.  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   3.  Velamentous insertion of the cord.   4.  Nuchal loop x2.         VIVIAN MCCARTY MD             D: 2018   T: 2018   MT: FRANK      Name:     LAZARA SILVA   MRN:      [id number]        Account:        [id number]   :      1990        Delivery Date:  2018               Document: [id number]

## 2018-02-20 NOTE — PLAN OF CARE
Problem: Labor (Cervical Ripen, Induct, Augment) (Adult,Obstetrics,Pediatric)  Goal: Signs and Symptoms of Listed Potential Problems Will be Absent, Minimized or Managed (Labor)  Signs and symptoms of listed potential problems will be absent, minimized or managed by discharge/transition of care (reference Labor (Cervical Ripen, Induct, Augment) (Adult,Obstetrics,Pediatric) CPG).   Outcome: Completed Date Met: 02/19/18  Patient resting comfortably with infant swaddled, and family supportive at bedside. Dr. Carrasco in attendance for delivery. See delivery summary and flowsheets for details.

## 2018-02-20 NOTE — PLAN OF CARE
Problem: Patient Care Overview  Goal: Plan of Care/Patient Progress Review  Outcome: Improving  Vital signs stable, assessment WNL. Pain controlled with Tylenol and ibuprofen; states satisfaction with pain management. Up in room independently with no complaints of dizziness, voiding without difficulty.  at bedside and supportive. Working on breastfeeding baby, using a nipple shield; some sleepy and disinterested attempts from baby. Encouraged to call RN with needs, will continue to monitor.

## 2018-02-20 NOTE — PLAN OF CARE
Data: Guerita SILVA transferred to [facility] via wheelchair at 1915. Baby transferred via parent's arms.  Action: Receiving unit notified of transfer: Yes. Patient and family notified of room change. Report given to Carissa LEWIS RN at 1920. Belongings sent to receiving unit. Accompanied by Registered Nurse. Oriented patient to surroundings. Call light within reach. ID bands double-checked with receiving RN.  Response: Patient tolerated transfer and is stable.

## 2018-02-20 NOTE — PLAN OF CARE
Problem: Patient Care Overview  Goal: Plan of Care/Patient Progress Review  Outcome: Improving  VSS, tolerating regular diet, and voiding. Pain controlled well with tylenol and ibuprofen. Pt using ice and tucks for comfort.  Baby breastfeeding well throughout shift with nipple shield; patient taught how to hand express. Parents independent with infant cares.

## 2018-02-20 NOTE — ANESTHESIA POSTPROCEDURE EVALUATION
Patient: Guerita SILVA    * No procedures listed *    Diagnosis:* No pre-op diagnosis entered *  Diagnosis Additional Information: No value filed.    Anesthesia Type:  No value filed.    Note:  Anesthesia Post Evaluation    Patient location during evaluation: Bedside  Patient participation: Able to fully participate in evaluation  Level of consciousness: awake  Pain management: adequate  Airway patency: patent  Cardiovascular status: acceptable  Respiratory status: acceptable  Hydration status: acceptable  PONV: controlled     Anesthetic complications: None          Last vitals:  Vitals:    02/20/18 1230 02/20/18 1436 02/20/18 1537   BP:   118/68   Pulse:      Resp: 16 16 16   Temp:   36.5  C (97.7  F)   SpO2:            Electronically Signed By: Rosa Mcghee MD  February 20, 2018  3:52 PM

## 2018-02-20 NOTE — PLAN OF CARE
Problem: Patient Care Overview  Goal: Plan of Care/Patient Progress Review  Patient meeting expected goals for this shift.  Using ibuprofen & tylenol for pain/comfort.  Right leg still partially numb and requiring assistance/sunny steterry for trips to the bathroom.  Working on breastfeeding  and  cares.   present at bedside and supportive.  Positive bonding behaviors observed.  Continue to monitor and notify MD as needed.

## 2018-02-21 VITALS
WEIGHT: 199 LBS | SYSTOLIC BLOOD PRESSURE: 125 MMHG | BODY MASS INDEX: 37.57 KG/M2 | TEMPERATURE: 97.2 F | HEIGHT: 61 IN | OXYGEN SATURATION: 99 % | RESPIRATION RATE: 16 BRPM | DIASTOLIC BLOOD PRESSURE: 80 MMHG | HEART RATE: 82 BPM

## 2018-02-21 PROCEDURE — 25000132 ZZH RX MED GY IP 250 OP 250 PS 637: Performed by: OBSTETRICS & GYNECOLOGY

## 2018-02-21 RX ADMIN — IBUPROFEN 800 MG: 400 TABLET ORAL at 00:28

## 2018-02-21 RX ADMIN — SENNOSIDES AND DOCUSATE SODIUM 1 TABLET: 8.6; 5 TABLET ORAL at 10:25

## 2018-02-21 RX ADMIN — ACETAMINOPHEN 650 MG: 325 TABLET, FILM COATED ORAL at 06:27

## 2018-02-21 RX ADMIN — IBUPROFEN 800 MG: 400 TABLET ORAL at 06:27

## 2018-02-21 RX ADMIN — IBUPROFEN 800 MG: 400 TABLET ORAL at 11:59

## 2018-02-21 RX ADMIN — ACETAMINOPHEN 650 MG: 325 TABLET, FILM COATED ORAL at 10:25

## 2018-02-21 RX ADMIN — ACETAMINOPHEN 650 MG: 325 TABLET, FILM COATED ORAL at 00:28

## 2018-02-21 NOTE — PLAN OF CARE
Problem: Patient Care Overview  Goal: Plan of Care/Patient Progress Review  Outcome: No Change  Patient doing well.  Up in room with a steady gait.  Voiding without difficulty.  Using tucks and icepack to perineum.  Fundus firm at U/1. Scant rubra flow.  Breast feeding baby girl every 2-3 hours with use of shield.  Continue to monitor.

## 2018-02-21 NOTE — PLAN OF CARE
Problem: Patient Care Overview  Goal: Plan of Care/Patient Progress Review  Outcome: Adequate for Discharge Date Met: 02/21/18  Ready for discharge home.  Understands discharge instructions.

## 2018-02-21 NOTE — LACTATION NOTE
Follow up visit.  Infant using shield for feedings.  Latched shallow to end of shield at time of visit.  Assisted Guerita with getting better latch and educated her on the importance of baby latching deeply.  Unable to get baby latched without shield.  Infant was able to latch well to shield with wider mouth after many attempts.  Fed well.  Occasional audible swallowing heard.  Reviewed indications infant is getting enough and importance of pumping with shield use.  Encouraged Guerita to follow up with outpatient lactation later this week and reviewed resources.  She had no further questions.   very helpful with feeding.    Gabriella Diaz  RN, IBCLC

## 2018-02-21 NOTE — PLAN OF CARE
Problem: Patient Care Overview  Goal: Plan of Care/Patient Progress Review  Outcome: Improving  VSS, breastfeeding well with shield, pain controlled with Tylenol and Ibuprofen, states satisfaction with pain management, up independently with no complaints of dizziness,  at bedside, interacting and bonding well with infant, encouraged to call with questions or concerns, will continue to monitor.

## 2018-04-05 ENCOUNTER — PRENATAL OFFICE VISIT (OUTPATIENT)
Dept: OBGYN | Facility: CLINIC | Age: 28
End: 2018-04-05
Payer: COMMERCIAL

## 2018-04-05 VITALS
WEIGHT: 187 LBS | DIASTOLIC BLOOD PRESSURE: 60 MMHG | SYSTOLIC BLOOD PRESSURE: 112 MMHG | BODY MASS INDEX: 35.3 KG/M2 | HEIGHT: 61 IN | HEART RATE: 68 BPM

## 2018-04-05 PROBLEM — Z34.02 ENCOUNTER FOR SUPERVISION OF NORMAL FIRST PREGNANCY IN SECOND TRIMESTER: Status: RESOLVED | Noted: 2017-06-29 | Resolved: 2018-04-05

## 2018-04-05 PROCEDURE — 99207 ZZC POST PARTUM EXAM: CPT | Performed by: OBSTETRICS & GYNECOLOGY

## 2018-04-05 RX ORDER — ACETAMINOPHEN AND CODEINE PHOSPHATE 120; 12 MG/5ML; MG/5ML
1 SOLUTION ORAL DAILY
Qty: 90 TABLET | Refills: 3 | Status: SHIPPED | OUTPATIENT
Start: 2018-04-05 | End: 2019-10-04

## 2018-04-05 ASSESSMENT — ANXIETY QUESTIONNAIRES
1. FEELING NERVOUS, ANXIOUS, OR ON EDGE: NOT AT ALL
GAD7 TOTAL SCORE: 0
3. WORRYING TOO MUCH ABOUT DIFFERENT THINGS: NOT AT ALL
IF YOU CHECKED OFF ANY PROBLEMS ON THIS QUESTIONNAIRE, HOW DIFFICULT HAVE THESE PROBLEMS MADE IT FOR YOU TO DO YOUR WORK, TAKE CARE OF THINGS AT HOME, OR GET ALONG WITH OTHER PEOPLE: NOT DIFFICULT AT ALL
6. BECOMING EASILY ANNOYED OR IRRITABLE: NOT AT ALL
7. FEELING AFRAID AS IF SOMETHING AWFUL MIGHT HAPPEN: NOT AT ALL
5. BEING SO RESTLESS THAT IT IS HARD TO SIT STILL: NOT AT ALL
2. NOT BEING ABLE TO STOP OR CONTROL WORRYING: NOT AT ALL

## 2018-04-05 ASSESSMENT — PATIENT HEALTH QUESTIONNAIRE - PHQ9: 5. POOR APPETITE OR OVEREATING: NOT AT ALL

## 2018-04-05 NOTE — MR AVS SNAPSHOT
"              After Visit Summary   4/5/2018    Guerita Jiménez    MRN: [id number]           Patient Information     Date Of Birth          1990        Visit Information        Provider Department      4/5/2018 11:40 AM Rafael Carrasco MD [facility] [place]        Today's Diagnoses     Routine postpartum follow-up    -  1       Follow-ups after your visit        Who to contact     If you have questions or need follow up information about today's clinic visit or your schedule please contact [facility] [place] directly at [phone].  Normal or non-critical lab and imaging results will be communicated to you by [employer], letter or phone within 4 business days after the clinic has received the results. If you do not hear from us within 7 days, please contact the clinic through [employer] or phone. If you have a critical or abnormal lab result, we will notify you by phone as soon as possible.  Submit refill requests through [employer] or call your pharmacy and they will forward the refill request to us. Please allow 3 business days for your refill to be completed.          Additional Information About Your Visit        MyChart Information     [employer] gives you secure access to your electronic health record. If you see a primary care provider, you can also send messages to your care team and make appointments. If you have questions, please call your primary care clinic.  If you do not have a primary care provider, please call [phone] and they will assist you.        Care EveryWhere ID     This is your Care EveryWhere ID. This could be used by other organizations to access your [place] medical records  [id number]        Your Vitals Were     Pulse Height Last Period Breastfeeding? BMI (Body Mass Index)       68 5' 1\" (1.549 m) 05/02/2017 Yes 35.33 kg/m2        Blood Pressure from Last 3 Encounters:   04/05/18 112/60   02/21/18 125/80   02/15/18 100/80    Weight from Last 3 " Encounters:   04/05/18 187 lb (84.8 kg)   02/19/18 199 lb (90.3 kg)   02/15/18 199 lb 9.6 oz (90.5 kg)              Today, you had the following     No orders found for display         Today's Medication Changes          These changes are accurate as of 4/5/18  1:18 PM.  If you have any questions, ask your nurse or doctor.               Start taking these medicines.        Dose/Directions    norethindrone 0.35 MG per tablet   Commonly known as:  MICRONOR   Used for:  Routine postpartum follow-up   Started by:  Rafael Carrasco MD        Dose:  1 tablet   Take 1 tablet (0.35 mg) by mouth daily   Quantity:  90 tablet   Refills:  3            Where to get your medicines      These medications were sent to [facility]/pharmacy #[id number] - [place], [place] - [address]  [address] , [place] [address]     Phone:  [phone]     norethindrone 0.35 MG per tablet                Primary Care Provider Office Phone # Fax #    Rafael Carrasco [phone] [address] [address]        Equal Access to Services     [facility] AH: Hadii jose ku hadasho Soraali, waaxda luqadaha, qaybta kaalmada adethuyasumit, alina hylton . So [facility] [phone].    ATENCIÓN: Si habla español, tiene a tang disposición servicios gratuitos de asistencia lingüística. [facility] [phone].    We comply with applicable federal civil rights laws and [place] laws. We do not discriminate on the basis of race, color, national origin, age, disability, sex, sexual orientation, or gender identity.            Thank you!     Thank you for choosing [facility]  for your care. Our goal is always to provide you with excellent care. Hearing back from our patients is one way we can continue to improve our services. Please take a few minutes to complete the written survey that you may receive in the mail after your visit with us. Thank you!             Your Updated  Medication List - Protect others around you: Learn how to safely use, store and throw away your medicines at www.disposemymeds.org.          This list is accurate as of 4/5/18  1:18 PM.  Always use your most recent med list.                   Brand Name Dispense Instructions for use Diagnosis    norethindrone 0.35 MG per tablet    MICRONOR    90 tablet    Take 1 tablet (0.35 mg) by mouth daily    Routine postpartum follow-up       PRENATAL VITAMINS PO           VITAMIN D (CHOLECALCIFEROL) PO      Take by mouth daily

## 2018-04-05 NOTE — PROGRESS NOTES
"  SUBJECTIVE:  Guerita Jiménez,  is here for a postpartum visit.  She had a  on 2018 delivering a healthy baby girl, named Nicolasa weighing 5 lbs 15 oz at term.      HPI:  Doing well. Baby is good. Moms mood is good.  Baby will need pulmonary surgery at 3months.  No bleeding.      Last PHQ-9 score on record=   PHQ-9 SCORE 2018   Total Score 0     KERRY-7 SCORE 10/20/2016 2017 2018   Total Score 0 0 0       Delivery complications:  No  Breast feeding:  Yes, pumping  Bladder problems:  No  Bowel problems/hemorrhoids:  No  Episiotomy/laceration/incision healed? Yes: no concerns  Vaginal flow:  None  Kep'el:  No  Contraception: Discuss  Emotional adjustment:  doing well and happy  Back to work:  May 13    12 point review of systems negative other than symptoms noted below.    OBJECTIVE:  Vitals: /60  Pulse 68  Ht 5' 1\" (1.549 m)  Wt 187 lb (84.8 kg)  LMP 2017  Breastfeeding? Yes  BMI 35.33 kg/m2  BMI= Body mass index is 35.33 kg/(m^2).  General - pleasant female in no acute distress.  Breast -  deferred  Abdomen - No incision  Ext gen- All healed.  ASSESSMENT:    ICD-10-CM    1. Routine postpartum follow-up Z39.2        PLAN:  May resume normal activities without restrictions.  Pap smear was not done today.    Full counseling was provided, and all questions were answered.   Return to clinic in one year for an annual visit.   Start Micronor now    Rafael Carrasco MD      "

## 2018-04-06 ASSESSMENT — ANXIETY QUESTIONNAIRES: GAD7 TOTAL SCORE: 0

## 2018-04-06 ASSESSMENT — PATIENT HEALTH QUESTIONNAIRE - PHQ9: SUM OF ALL RESPONSES TO PHQ QUESTIONS 1-9: 0

## 2018-07-05 ENCOUNTER — TELEPHONE (OUTPATIENT)
Dept: NURSING | Facility: CLINIC | Age: 28
End: 2018-07-05

## 2018-07-05 DIAGNOSIS — Z78.9 USES BIRTH CONTROL: Primary | ICD-10-CM

## 2018-07-05 RX ORDER — NORETHINDRONE ACETATE AND ETHINYL ESTRADIOL 1.5-30(21)
1 KIT ORAL DAILY
Qty: 84 TABLET | Refills: 2 | Status: SHIPPED | OUTPATIENT
Start: 2018-07-05 | End: 2019-10-04

## 2018-07-05 NOTE — TELEPHONE ENCOUNTER
4/5/18 Post Partum Visit. Pt has stopped breast feeding and would like to go back on OCP she was taking before, norethindrone-ethinyl estradiol (JUNEL 1.5/30) 1.5-30 MG-MCG per tablet. Routing to Dr. Goodman, on call. OK to send?

## 2018-11-23 ENCOUNTER — HEALTH MAINTENANCE LETTER (OUTPATIENT)
Age: 28
End: 2018-11-23

## 2019-04-22 ENCOUNTER — TELEPHONE (OUTPATIENT)
Dept: OBGYN | Facility: CLINIC | Age: 29
End: 2019-04-22

## 2019-04-22 DIAGNOSIS — B00.1 COLD SORE: Primary | ICD-10-CM

## 2019-04-22 RX ORDER — VALACYCLOVIR HYDROCHLORIDE 1 G/1
2000 TABLET, FILM COATED ORAL 2 TIMES DAILY
Qty: 12 TABLET | Refills: 1 | Status: SHIPPED | OUTPATIENT
Start: 2019-04-22 | End: 2019-10-04

## 2019-04-22 NOTE — TELEPHONE ENCOUNTER
valACYclovir (VALTREX) 1000 mg tablet      Last Written Prescription Date:  10/20/16  Last Fill Quantity: 90,   # refills: 2  Last Office Visit with [facility] [place] care provider: 4/5/18  Zenaidaerman  Future Office visit: none    Routing refill request to provider for review/approval because:  WILBERTO

## 2019-04-22 NOTE — TELEPHONE ENCOUNTER
Patient requesting refill on cold sore medication sent to [facility] in [place].

## 2019-04-22 NOTE — TELEPHONE ENCOUNTER
Pt is having problems with cold sore outbreaks. Would like Rx to treat today. Routing to Dr. Goodman, on call. OK to send?

## 2019-09-16 NOTE — PROGRESS NOTES
Guerita is a 29 year old  female who presents for annual exam.     Besides routine health maintenance, she has no other health concerns today .    HPI:  Baby daughter had cystic area of lung removed. Doing great.   Wants to go back on OCPs. Hx of continuous  Bladder good.        GYNECOLOGIC HISTORY:    Patient's last menstrual period was 2019.  Her current contraception method is: condoms.  She  reports that she has never smoked. She has never used smokeless tobacco.    Patient is sexually active.  STD testing offered?  Declined  Last PHQ-9 score on record =   PHQ-9 SCORE 10/4/2019   PHQ-9 Total Score 0     Last GAD7 score on record =   KERRY-7 SCORE 10/4/2019   Total Score 0     Alcohol Score = 1    HEALTH MAINTENANCE:  Cholesterol: None found  Last Mammo: Never, Result: not applicable, Next Mammo: Age 40  Pap:   Lab Results   Component Value Date    PAP NIL 2015      Colonoscopy:  Never, Result: not applicable, Next Colonoscopy: Age 50  Dexa:  Never    Health maintenance updated:  No, needs pap    HISTORY:  OB History    Para Term  AB Living   1 1 1 0 0 1   SAB TAB Ectopic Multiple Live Births   0 0 0 0 1      # Outcome Date GA Lbr Damián/2nd Weight Sex Delivery Anes PTL Lv   1 Term 18 39w0d / 01:54 2.71 kg (5 lb 15.6 oz) F Vag-Spont EPI N RALF      Name: JAVI SILVA      Apgar1: 9  Apgar5: 9       Patient Active Problem List   Diagnosis     CARDIOVASCULAR SCREENING; LDL GOAL LESS THAN 160     General counseling for initiation of other contraceptive measures     Recurrent cold sores     Indication for care in labor or delivery     Vaginal delivery     Past Surgical History:   Procedure Laterality Date     C REPAIR CRUCIATE LIGAMENT,KNEE  2006    left knee     HC CREATE EARDRUM OPENING,GEN ANESTH      P.E. Tubes     HC REMOVAL ADENOIDS,PRIMARY,<11 Y/O  2001     LUMPECTOMY BREAST Left       Social History     Tobacco Use     Smoking status: Never Smoker      "Smokeless tobacco: Never Used     Tobacco comment: No smokers in home.   Substance Use Topics     Alcohol use: Yes     Alcohol/week: 0.0 standard drinks      Problem (# of Occurrences) Relation (Name,Age of Onset)    Breast Cancer (1) Maternal Grandmother    Diabetes (1) Maternal Grandfather    Heart Disease (1) Maternal Grandfather (57): triple bypass    Hyperlipidemia (1) Maternal Grandfather    Hypertension (1) Maternal Grandfather    No Known Problems (6) Mother, Father, Sister, Brother, Paternal Grandmother, Other            Current Outpatient Medications   Medication Sig     norethindrone-ethinyl estradiol-iron (MICROGESTIN FE1.5/30) 1.5-30 MG-MCG tablet Take 1 tablet by mouth daily continuously     valACYclovir (VALTREX) 1000 mg tablet Take 2 tablets (2,000 mg) by mouth 2 times daily     No current facility-administered medications for this visit.      Allergies   Allergen Reactions     No Known Drug Allergies        Past medical, surgical, social and family histories were reviewed and updated in EPIC.    ROS:   12 point review of systems negative other than symptoms noted above    EXAM:  /82   Pulse 78   Ht 1.549 m (5' 1\")   Wt 91.4 kg (201 lb 9.6 oz)   LMP 09/04/2019   BMI 38.09 kg/m     BMI: Body mass index is 38.09 kg/m .    PHYSICAL EXAM:  Constitutional:  Appearance: Well nourished, well developed, alert, in no acute distress  Neck:  Lymph Nodes:  No lymphadenopathy present    Thyroid:  Gland size normal, nontender, no nodules or masses present  on palpation  Chest:  Respiratory Effort:  Breathing unlabored  Cardiovascular:    Heart: Auscultation:  Regular rate, normal rhythm, no murmurs present  Breasts: Inspection of Breasts:  No lymphadenopathy present., Palpation of Breasts and Axillae:  No masses present on palpation, no breast tenderness., Axillary Lymph Nodes:  No lymphadenopathy present. and No nodularity, asymmetry or nipple discharge bilaterally.  Gastrointestinal:   Abdominal " Examination:  Abdomen nontender to palpation, tone normal without rigidity or guarding, no masses present, umbilicus without lesions   Liver and Spleen:  No hepatomegaly present, liver nontender to palpation    Hernias:  No hernias present  Lymphatic: Lymph Nodes:  No other lymphadenopathy present  Skin:  General Inspection:  No rashes present, no lesions present, no areas of  discoloration    Genitalia and Groin:  No rashes present, no lesions present, no areas of  discoloration, no masses present  Neurologic/Psychiatric:    Mental Status:  Oriented X3     Pelvic Exam:  External Genitalia:     Normal appearance for age, no discharge present, no tenderness present, no inflammatory lesions present, color normal  Vagina:     Normal vaginal vault without central or paravaginal defects, no discharge present, no inflammatory lesions present, no masses present  Bladder:     Nontender to palpation  Urethra:   Urethral Body:  Urethra palpation normal, urethra structural support normal   Urethral Meatus:  No erythema or lesions present  Cervix:     Appearance healthy, no lesions present, nontender to palpation, no bleeding present  Uterus:     Uterus: firm, normal sized and nontender, anteverted in position.   Adnexa:     No adnexal tenderness present, no adnexal masses present  Perineum:     Perineum within normal limits, no evidence of trauma, no rashes or skin lesions present  Anus:     Anus within normal limits, no hemorrhoids present  Inguinal Lymph Nodes:     No lymphadenopathy present  Pubic Hair:     Normal pubic hair distribution for age  Genitalia and Groin:     No rashes present, no lesions present, no areas of discoloration, no masses present      COUNSELING:   Reviewed preventive health counseling, as reflected in patient instructions       Regular exercise    BMI: Body mass index is 38.09 kg/m .  Weight management plan: Encouraged weight loss    ASSESSMENT:  29 year old female with satisfactory annual exam.     ICD-10-CM    1. Encounter for gynecological examination without abnormal finding Z01.419 Pap imaged thin layer screen reflex to HPV if ASCUS - recommended age 25 - 29 years   2. Cold sore B00.1 valACYclovir (VALTREX) 1000 mg tablet   3. Uses birth control Z78.9 norethindrone-ethinyl estradiol-iron (MICROGESTIN FE1.5/30) 1.5-30 MG-MCG tablet       PLAN:  Rx given. Start D#1 of cycle.       Rafael Carrasco MD

## 2019-09-27 ENCOUNTER — HEALTH MAINTENANCE LETTER (OUTPATIENT)
Age: 29
End: 2019-09-27

## 2019-10-04 ENCOUNTER — OFFICE VISIT (OUTPATIENT)
Dept: OBGYN | Facility: CLINIC | Age: 29
End: 2019-10-04
Payer: COMMERCIAL

## 2019-10-04 VITALS
SYSTOLIC BLOOD PRESSURE: 114 MMHG | DIASTOLIC BLOOD PRESSURE: 82 MMHG | HEIGHT: 61 IN | BODY MASS INDEX: 38.06 KG/M2 | HEART RATE: 78 BPM | WEIGHT: 201.6 LBS

## 2019-10-04 DIAGNOSIS — Z01.419 ENCOUNTER FOR GYNECOLOGICAL EXAMINATION WITHOUT ABNORMAL FINDING: Primary | ICD-10-CM

## 2019-10-04 DIAGNOSIS — Z78.9 USES BIRTH CONTROL: ICD-10-CM

## 2019-10-04 DIAGNOSIS — B00.1 COLD SORE: ICD-10-CM

## 2019-10-04 PROCEDURE — 99395 PREV VISIT EST AGE 18-39: CPT | Performed by: OBSTETRICS & GYNECOLOGY

## 2019-10-04 PROCEDURE — G0145 SCR C/V CYTO,THINLAYER,RESCR: HCPCS | Performed by: OBSTETRICS & GYNECOLOGY

## 2019-10-04 RX ORDER — NORETHINDRONE ACETATE AND ETHINYL ESTRADIOL 1.5-30(21)
1 KIT ORAL DAILY
Qty: 112 TABLET | Refills: 3 | Status: SHIPPED | OUTPATIENT
Start: 2019-10-04 | End: 2020-10-16

## 2019-10-04 RX ORDER — VALACYCLOVIR HYDROCHLORIDE 1 G/1
2000 TABLET, FILM COATED ORAL 2 TIMES DAILY
Qty: 12 TABLET | Refills: 3 | Status: SHIPPED | OUTPATIENT
Start: 2019-10-04 | End: 2020-10-16

## 2019-10-04 ASSESSMENT — ANXIETY QUESTIONNAIRES
6. BECOMING EASILY ANNOYED OR IRRITABLE: NOT AT ALL
IF YOU CHECKED OFF ANY PROBLEMS ON THIS QUESTIONNAIRE, HOW DIFFICULT HAVE THESE PROBLEMS MADE IT FOR YOU TO DO YOUR WORK, TAKE CARE OF THINGS AT HOME, OR GET ALONG WITH OTHER PEOPLE: NOT DIFFICULT AT ALL
1. FEELING NERVOUS, ANXIOUS, OR ON EDGE: NOT AT ALL
2. NOT BEING ABLE TO STOP OR CONTROL WORRYING: NOT AT ALL
5. BEING SO RESTLESS THAT IT IS HARD TO SIT STILL: NOT AT ALL
7. FEELING AFRAID AS IF SOMETHING AWFUL MIGHT HAPPEN: NOT AT ALL
3. WORRYING TOO MUCH ABOUT DIFFERENT THINGS: NOT AT ALL
GAD7 TOTAL SCORE: 0

## 2019-10-04 ASSESSMENT — PATIENT HEALTH QUESTIONNAIRE - PHQ9
SUM OF ALL RESPONSES TO PHQ QUESTIONS 1-9: 0
5. POOR APPETITE OR OVEREATING: NOT AT ALL

## 2019-10-04 ASSESSMENT — MIFFLIN-ST. JEOR: SCORE: 1576.83

## 2019-10-05 ASSESSMENT — ANXIETY QUESTIONNAIRES: GAD7 TOTAL SCORE: 0

## 2019-10-10 LAB
COPATH REPORT: NORMAL
PAP: NORMAL

## 2020-10-16 DIAGNOSIS — B00.1 COLD SORE: ICD-10-CM

## 2020-10-16 DIAGNOSIS — Z78.9 USES BIRTH CONTROL: ICD-10-CM

## 2020-10-16 RX ORDER — NORETHINDRONE ACETATE AND ETHINYL ESTRADIOL 1.5-30(21)
1 KIT ORAL DAILY
Qty: 28 TABLET | Refills: 1 | Status: SHIPPED | OUTPATIENT
Start: 2020-10-16

## 2020-10-16 RX ORDER — VALACYCLOVIR HYDROCHLORIDE 1 G/1
2000 TABLET, FILM COATED ORAL 2 TIMES DAILY
Qty: 12 TABLET | Refills: 0 | Status: SHIPPED | OUTPATIENT
Start: 2020-10-16

## 2020-10-16 NOTE — TELEPHONE ENCOUNTER
"Requested Prescriptions   Pending Prescriptions Disp Refills     norethindrone-ethinyl estradiol-iron (MICROGESTIN FE1.5/30) 1.5-30 MG-MCG tablet 112 tablet 3     Sig: Take 1 tablet by mouth daily continuously       Contraceptives Protocol Failed - 10/16/2020  9:40 AM        Failed - Recent (12 mo) or future (30 days) visit within the authorizing provider's specialty     Patient has had an office visit with the authorizing provider or a provider within the authorizing providers department within the previous 12 mos or has a future within next 30 days. See \"Patient Info\" tab in inbasket, or \"Choose Columns\" in Meds & Orders section of the refill encounter.              Passed - Patient is not a current smoker if age is 35 or older        Passed - Medication is active on med list        Passed - No active pregnancy on record        Passed - No positive pregnancy test in past 12 months           Last Written Prescription Date:  10/04/2019  Last Fill Quantity: 112,  # refills: 3   Last office visit: 10/4/2019 with prescribing provider:  Rafael Carrasco   Future Office Visit:  none  Medication is being filled for 1 time refill only due to:  appointment needed for further refills   Melissa Moran RN on 10/16/2020 at 10:43 AM        "

## 2020-10-16 NOTE — TELEPHONE ENCOUNTER
"Requested Prescriptions   Pending Prescriptions Disp Refills     valACYclovir (VALTREX) 1000 mg tablet 12 tablet 3     Sig: Take 2 tablets (2,000 mg) by mouth 2 times daily       Antivirals for Herpes Protocol Failed - 10/16/2020  9:35 AM        Failed - Recent (12 mo) or future (30 days) visit within the authorizing provider's specialty     Patient has had an office visit with the authorizing provider or a provider within the authorizing providers department within the previous 12 mos or has a future within next 30 days. See \"Patient Info\" tab in inbasket, or \"Choose Columns\" in Meds & Orders section of the refill encounter.              Failed - Normal serum creatinine on file in past 12 months     No lab results found.    Ok to refill medication if creatinine is low          Passed - Patient is age 12 or older        Passed - Medication is active on med list           Last Written Prescription Date:  10/04/2019  Last Fill Quantity: 09/22/2020,  # refills: 3   Last office visit: 10/4/2019 with prescribing provider:  Rafael Carrasco   Future Office Visit: none     Medication is being filled for 1 time refill only due to:  appointment needed for further refills   Melissa Moran RN on 10/16/2020 at 11:05 AM          "

## 2021-01-09 ENCOUNTER — HEALTH MAINTENANCE LETTER (OUTPATIENT)
Age: 31
End: 2021-01-09

## 2021-10-23 ENCOUNTER — HEALTH MAINTENANCE LETTER (OUTPATIENT)
Age: 31
End: 2021-10-23

## 2022-02-12 ENCOUNTER — HEALTH MAINTENANCE LETTER (OUTPATIENT)
Age: 32
End: 2022-02-12

## 2022-08-23 NOTE — PLAN OF CARE
Dr. Carrasco paged with call returned. Updated regarding, but not limited to, patient uncomfortable after rebolus. SVE 10/100/0. Plan to start pushing. Patient in agreement with plan. Dr. Carrasco heading in to hospital.   O-T Plasty Text: The defect edges were debeveled with a #15 scalpel blade.  Given the location of the defect, shape of the defect and the proximity to free margins an O-T plasty was deemed most appropriate.  Using a sterile surgical marker, an appropriate O-T plasty was drawn incorporating the defect and placing the expected incisions within the relaxed skin tension lines where possible.    The area thus outlined was incised deep to adipose tissue with a #15 scalpel blade.  The skin margins were undermined to an appropriate distance in all directions utilizing iris scissors.

## 2022-10-09 ENCOUNTER — HEALTH MAINTENANCE LETTER (OUTPATIENT)
Age: 32
End: 2022-10-09

## 2023-03-25 ENCOUNTER — HEALTH MAINTENANCE LETTER (OUTPATIENT)
Age: 33
End: 2023-03-25

## 2023-07-26 NOTE — PROGRESS NOTES
"Please see \"Imaging\" tab under \"Chart Review\" for details of today's US.    Lazara Brumfield, DO    " Goal Outcome Evaluation:  Plan of Care Reviewed With: patient        Progress: no change  Outcome Evaluation: PT LYING IN BED RESTING COMFORTABLY THIS SHIFT. NO ACUTE EVENTS THIS SHIFT. VSS. WILL CONTINUE TO MONITOR.

## 2024-08-26 ENCOUNTER — TELEPHONE (OUTPATIENT)
Dept: CARDIOLOGY | Facility: CLINIC | Age: 34
End: 2024-08-26

## 2024-08-26 NOTE — TELEPHONE ENCOUNTER
Patient calling to set up gene testing before November.     Tuyet Garcia  Periop Electrophysiology   [phone]

## 2024-08-27 ENCOUNTER — TELEPHONE (OUTPATIENT)
Dept: CARDIOLOGY | Facility: CLINIC | Age: 34
End: 2024-08-27
Payer: COMMERCIAL

## 2024-09-18 NOTE — CONFIDENTIAL NOTE
Here is a copy of the progress note from your recent genetic counseling visit [facility] on Date: 2024.    PROGRESS NOTE:Guerita was seen for genetic counseling due to the family history of hypertrophic cardiomyopathy (HCM).  I had the opportunity to talk with Guerita and her brother Pattie today to discuss the genetic component of HCM and testing options available to her.     MEDICAL HISTORY:Guerita is in good health.  *** No history of fainting, dizziness, or  lightheadedness.  She has not had any cardiac screening at this time but will plan to do so based on genetic testing results.      FAMILY HISTORY:A detailed family history was obtained from her mother and updated today.  It is significant for the following cardiac history:    Mother Ghislaine diagnosed with HCM about 5 years ago. Recently had genetic testing that revealed a mutation in the MYBPC3 gene and a VUS in the MYPN gene. She also has a history of AFib.  Paternal half sister  at 49 with obesity and heart failure.  Paternal half sister  at 13 with Down Syndrome.  Paternal half brother (59) with high blood pressure.  Paternal half sister (50) in good health.  Maternal grandmother  at 20 years, 2 days after delivering Ghislaine.  She had an enlarged heart.  Maternal grandfather (81) has dementia but has a history of triple bypass, diabetes, and high blood pressure.    Brother (35) has a heart murmur diagnosed at 5 years. His son is in good health and another child is on the way.  Daughter (34) in good health, as are her two children.  There is no additional history of cardiomyopathy, arrhythmias, heart attacks, fainting, sudden cardiac death, genetic conditions, or birth defects. (Scanned pedigree may be under media tab)    DISCUSSION:  Reviewed diagnosis of hypertrophic cardiomyopathy (HCM) found in the family. Explained that gene mutations are found in 50% of patients with HCM.  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 Reviewed AD inheritance. Explained that most AD cardiac mutations are inherited from a parent, therefore it is appropriate to offer genetic testing in parents,siblings, and children.  Each of those family members has a 50% risk of carrying the same gene. Explained variable expressivity and reduced penetrance which can help explain why a condition can be genetic even when there is no apparent family history.     Guerita understands that if she is found to carry a mutation, she will be at increased risk for developing the disease and each of her children will have a 50% risk of carrying the mutation as well. If Guerita does NOT carry the familial mutation, she will not be at increased risk for developing the disease and her children will NOT be at risk for developing this form of HCM.     Reviewed logistics, capabilities and limitations of genetic testing. DNA sample via saliva or blood is collected and sent to testing lab for evaluation of selected genes. Turn around time for results of 2-4 weeks. Reviewed cost of testing thru commercial lab.  Lab is currently offering free testing to at risk family members.  This offer does not include the cost of genetic counseling.    Discussed pros and cons of genetic testing. Explained that results could significantly impact management and treatment decisions.  Reviewed possible issues associated with presymptomatic testing including genetic discrimination, current laws to prevent discrimination (ie. EVE), insurance issues, and emotional and psychosocial outcomes of testing.     Recommend clinical evaluation for all first degree relatives (parents, siblings, and children) of an affected individual regardless of decision to pursue genetic testing. Clinical evaluation needs to be performed on an ongoing  basis and should include history, cardiac exam, ECHO, EKG, Holter monitoring, and exercise treadmill.      All questions answered at this time.    PLAN:***    TOTAL TIME SPENT IN COUNSELING:*** minutes    Cande Garza MS, [facility]  Licensed, Certified Genetic Counselor  [facility]  [facility]

## 2024-09-19 ENCOUNTER — LAB (OUTPATIENT)
Dept: LAB | Facility: CLINIC | Age: 34
End: 2024-09-19
Payer: COMMERCIAL

## 2024-09-19 ENCOUNTER — OFFICE VISIT (OUTPATIENT)
Dept: CARDIOLOGY | Facility: CLINIC | Age: 34
End: 2024-09-19
Payer: COMMERCIAL

## 2024-09-19 DIAGNOSIS — Z84.81 FAMILY HISTORY OF GENE MUTATION: Primary | ICD-10-CM

## 2024-09-19 DIAGNOSIS — Z82.49 FAMILY HISTORY OF HYPERTROPHIC CARDIOMYOPATHY: ICD-10-CM

## 2024-09-19 DIAGNOSIS — Z84.81 FAMILY HISTORY OF GENE MUTATION: ICD-10-CM

## 2024-09-19 PROCEDURE — 96040 PR GENETIC COUNSELING, EACH 30 MIN: CPT | Performed by: GENETIC COUNSELOR, MS

## 2024-09-19 PROCEDURE — 36415 COLL VENOUS BLD VENIPUNCTURE: CPT | Performed by: GENETIC COUNSELOR, MS

## 2024-09-19 NOTE — PROGRESS NOTES
Here is a copy of the progress note from your recent genetic counseling visit to [facility] on Date: 2024.    PROGRESS NOTE:Guerita was seen for genetic counseling due to the family history of hypertrophic cardiomyopathy (HCM).  I had the opportunity to talk with Guerita and her brother Pattie today to discuss the genetic component of HCM and testing options available to her.     MEDICAL HISTORY:Guerita is in good health.  She reports no history of fainting, dizziness, or lightheadedness.  She has not had any cardiac screening at this time but will plan to do so based on genetic testing results.    FAMILY HISTORY:A detailed family history was obtained from her mother and updated today.  It is significant for the following cardiac history:    Mother Ghislaine diagnosed with HCM about 5 years ago. Recently had genetic testing that revealed a mutation in the MYBPC3 gene and a VUS in the MYPN gene. She also has a history of AFib.  Paternal half sister (Guerita's aunt)  at 49 with obesity and heart failure.  Paternal half sister  at 13 with Down Syndrome.  Paternal half brother (59) with high blood pressure.  Paternal half sister (50) in good health.  Maternal grandmother  at 20 years, 2 days after delivering Ghislaine.  She had an enlarged heart.  Maternal grandfather (81) has dementia but has a history of triple bypass, diabetes, and high blood pressure.  Father has type 2 diabetes.  Paternal 6 aunts, and 2 uncles have no known heart issues.    Paternal grandparents  with history of cancer.  Brother (35) has a heart murmur diagnosed at 5 years. His son is in good health and another child is on the way.  Son (2) and daughter (6) are in good health.  There is no additional history of cardiomyopathy, arrhythmias, heart attacks, fainting, sudden cardiac death, genetic conditions, or birth defects. (Scanned pedigree may be under media tab)    DISCUSSION:   Reviewed diagnosis of hypertrophic cardiomyopathy (HCM) found in the family. Explained that gene mutations are found in 50% of patients with HCM.  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 Reviewed AD inheritance. Explained that most AD cardiac mutations are inherited from a parent, therefore it is appropriate to offer genetic testing in parents,siblings, and children.  Each of those family members has a 50% risk of carrying the same gene. Explained variable expressivity and reduced penetrance which can help explain why a condition can be genetic even when there is no apparent family history.     Guerita understands that if she is found to carry a mutation, she will be at increased risk for developing the disease and each of her children will have a 50% risk of carrying the mutation as well. If Guerita does NOT carry the familial mutation, she will not be at increased risk for developing the disease and her children will NOT be at risk for developing this form of HCM.     Reviewed logistics, capabilities and limitations of genetic testing. DNA sample via saliva or blood is collected and sent to testing lab for evaluation of selected genes. Turn around time for results of 2-4 weeks. Reviewed cost of testing thru commercial lab.  Lab is currently offering free testing to at risk family members.  This offer does not include the cost of genetic counseling.    Discussed pros and cons of genetic testing. Explained that results could significantly impact management and treatment decisions.  Reviewed possible issues associated with presymptomatic testing including genetic discrimination, current laws to prevent discrimination (ie. EVE), insurance issues, and emotional and psychosocial outcomes of testing.     Recommend clinical evaluation for all first degree relatives (parents,  siblings, and children) of an affected individual regardless of decision to pursue genetic testing. Clinical evaluation needs to be performed on an ongoing basis and should include history, cardiac exam, ECHO, EKG, Holter monitoring, and exercise treadmill.      All questions answered at this time.    PLAN:Guerita elected to proceed with genetic testing.  Requisition and consent forms were completed and signed.  DNA will be collected via blood sample and sent to [facility]. I will contact patient when results are available.    TOTAL TIME SPENT IN COUNSELIN minutes    Cande Garza MS, [facility]  Licensed, Certified Genetic Counselor  [facility]  [facility]

## 2024-09-19 NOTE — PATIENT INSTRUCTIONS
Indication for Genetic Counseling:     Hypertrophic cardiomyopathy (HCM) is a condition that causes part of the heart muscle (the left ventricle) to become thick and enlarged (hypertrophy).  It is usually diagnosed by echocardiogram (ECHO) or cardiac magnetic resonance imaging (MRI).  When the heart becomes enlarged, it cannot pump blood as effectively, which can lead to symptoms such as shortness of breath, fatigue, chest pains, irregular heartbeat, fainting, stroke, cardiac arrest, and sudden cardiac death (SCD).  HCM can be caused by a variety of factors, such as chronic hypertension, a narrow aorta, athletic heart, or genetics.  There are at least 20 known genes that, when not working properly, can cause HCM.    Inheritance:   Humans have over 20,000 genes that instruct our bodies how to grow and function.  We have two copies of each gene because we inherit one from our mother and one from our father.  The condition in your family is inherited in an autosomal dominant (AD) pattern. Dominant means that only one copy of the gene needs to be broken (gene mutation or pathogenic variant).  Since most people with a dominant condition have one normal gene and one broken gene, the risk that other family members carry the same gene is increased.  Parents, siblings, and children have a 50% chance of inheriting the mutation.      Testing Options:   Genetic testing is performed to assess a panel of genes known to cause this condition.  The test reads through the DNA of these genes (sequencing) to look for spelling mistakes or mutations that could cause the condition.      Genetic testing previously performed in your family member identified a gene mutation.    Therefore, instead of looking at all genes associated with this condition, this genetic test will look for the specific mutation(s) found in your family member(s).  If the familial mutation(s) is detected, you are at increased risk to develop the condition and should  follow the recommended screening guidelines provided by a cardiologist.  If the mutation is not detected, you are not at increased risk to develop the condition based on your genetic results.      Although we predict everyone who carries a mutation is at increased risk for developing the condition, we cannot predict age of onset or severity of symptoms due to reduced penetrance and variable expressivity.    Logistics:   Genetic testing involves collecting a sample of DNA, thru blood, saliva, or cheek cells.  The sample will be sent to a laboratory to extract the DNA and sequence the genes for mutations.  The laboratory will work with your insurance company to determine the out of pocket (OOP) cost and will notify you if the OOP cost is greater than $100.  Remember to ask the lab about financial assistance pricing and self pay options as well.  Sometimes those are much lower than insurance pricing.  When testing is initiated, results take about 2-4 weeks to return. I will contact you over the phone when results are available.     Genetic Information and Nondiscrimination Act:  The Genetic Information and Nondiscrimination Act of 2008 (EVE) is a federal law that protects individuals from genetic discrimination in health insurance and employment. Genetic discrimination is defined as the misuse of genetic information. This law does not address potential discrimination regarding life insurance or disability insurance.      This is especially relevant for at risk individuals who are considering presymptomatic testing.    Screening Recommendations:  Recommend clinical evaluation for all first degree relatives (parents, siblings, and children) of an affected individual regardless of decision to pursue genetic testing.  Clinical evaluation should be performed at least every 3 years, except yearly during puberty, and should include history, cardiac exam, ECHO, EKG, Holter monitoring, and exercise  treadmill.    Resources:  Hypertrophic Cardiomyopathy Association - 4hcm.org  Children's Cardiomyopathy Foundation - childrenscardiomyopathy.org  [place] Cardiomyopathy Association - cardiomyopathy.org  HCM Care phone lorena    General   American Heart Association - americanmobicanvasrt.Access Northeast  Genetics Home Reference - ghr.nlm.nih.gov  Genetic Information and Nondiscrimination Act - ginahelp.org    Contact Information:  Cande Garza MS, [facility] [place]  Licensed, Certified Genetic Counselor  [facility]  [facility]     Office: [phone]  [phone]  Fax: [phone]  Email: [email or web address]

## 2024-09-19 NOTE — LETTER
2024    Physician No Ref-Primary  No address on file    RE: Guerita Anne Jiménez       Dear Colleague,     I had the [facility].  Here is a copy of the progress note from your recent genetic counseling visit to the [facility] on Date: 2024.    PROGRESS NOTE:Guerita was seen for genetic counseling due to the family history of hypertrophic cardiomyopathy (HCM).  I had the opportunity to talk with Guerita and her brother Pattie today to discuss the genetic component of HCM and testing options available to her.     MEDICAL HISTORY:Guerita is in good health.  She reports no history of fainting, dizziness, or lightheadedness.  She has not had any cardiac screening at this time but will plan to do so based on genetic testing results.    FAMILY HISTORY:A detailed family history was obtained from her mother and updated today.  It is significant for the following cardiac history:    Mother Ghislaine diagnosed with HCM about 5 years ago. Recently had genetic testing that revealed a mutation in the MYBPC3 gene and a VUS in the MYPN gene. She also has a history of AFib.  Paternal half sister (Guerita's aunt)  at 49 with obesity and heart failure.  Paternal half sister  at 13 with Down Syndrome.  Paternal half brother (59) with high blood pressure.  Paternal half sister (50) in good health.  Maternal grandmother  at 20 years, 2 days after delivering Ghislaine.  She had an enlarged heart.  Maternal grandfather (81) has dementia but has a history of triple bypass, diabetes, and high blood pressure.  Father has type 2 diabetes.  Paternal 6 aunts, and 2 uncles have no known heart issues.    Paternal grandparents  with history of cancer.  Brother (35) has a heart murmur diagnosed at 5 years. His son is in good health and another child is on the way.  Son (2) and daughter (6) are in good health.  There is no  additional history of cardiomyopathy, arrhythmias, heart attacks, fainting, sudden cardiac death, genetic conditions, or birth defects. (Scanned pedigree may be under media tab)    DISCUSSION:  Reviewed diagnosis of hypertrophic cardiomyopathy (HCM) found in the family. Explained that gene mutations are found in 50% of patients with HCM.  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 Reviewed AD inheritance. Explained that most AD cardiac mutations are inherited from a parent, therefore it is appropriate to offer genetic testing in parents,siblings, and children.  Each of those family members has a 50% risk of carrying the same gene. Explained variable expressivity and reduced penetrance which can help explain why a condition can be genetic even when there is no apparent family history.     Guerita understands that if she is found to carry a mutation, she will be at increased risk for developing the disease and each of her children will have a 50% risk of carrying the mutation as well. If Guerita does NOT carry the familial mutation, she will not be at increased risk for developing the disease and her children will NOT be at risk for developing this form of HCM.     Reviewed logistics, capabilities and limitations of genetic testing. DNA sample via saliva or blood is collected and sent to testing lab for evaluation of selected genes. Turn around time for results of 2-4 weeks. Reviewed cost of testing thru commercial lab.  Lab is currently offering free testing to at risk family members.  This offer does not include the cost of genetic counseling.    Discussed pros and cons of genetic testing. Explained that results could significantly impact management and treatment decisions.  Reviewed possible issues associated with presymptomatic testing including genetic discrimination,  current laws to prevent discrimination (ie. EVE), insurance issues, and emotional and psychosocial outcomes of testing.     Recommend clinical evaluation for all first degree relatives (parents, siblings, and children) of an affected individual regardless of decision to pursue genetic testing. Clinical evaluation needs to be performed on an ongoing basis and should include history, cardiac exam, ECHO, EKG, Holter monitoring, and exercise treadmill.      All questions answered at this time.    PLAN:Guerita elected to proceed with genetic testing.  Requisition and consent forms were completed and signed.  DNA will be collected via blood sample and sent to [facility]. I will contact patient when results are available.    TOTAL TIME SPENT IN COUNSELIN minutes    Cande Garza MS, [facility]  Licensed, Certified Genetic Counselor  [facility]  [facility]         Thank you for allowing me to participate in the care of your patient.      Sincerely,     Cande Garza GC     [facility]  cc:   No referring provider defined for this encounter.

## 2024-09-20 LAB
PERFORMING LABORATORY: NORMAL
SPECIMEN STATUS: NORMAL
TEST NAME: NORMAL

## 2024-10-04 ENCOUNTER — TELEPHONE (OUTPATIENT)
Dept: CARDIOLOGY | Facility: CLINIC | Age: 34
End: 2024-10-04
Payer: COMMERCIAL

## 2024-10-04 LAB
SCANNED LAB RESULT: NORMAL
TEST NAME: NORMAL

## 2024-10-08 NOTE — TELEPHONE ENCOUNTER
Spoke with Guerita today to review results of genetic testing. she underwent genetic testing on September 19, 2024 due to the family history of hypertrophic cardiomyopathy (HCM) and a known genetic mutation in her mother.  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 This result significantly reduces, but does not eliminate, her risk to develop cardiomyopathy.  Based on these results, clinical screening is not recommended for Guerita at this time. However, she should seek care if she has any symptoms associated with cardiomyopathy or other heart problems.   A summary letter and copy of the results will be sent to patient. All questions answered at this time.   Cande Garza MS, CGC  Licensed, Certified Genetic Counselor  [facility]  [facility]

## 2025-08-16 ENCOUNTER — HEALTH MAINTENANCE LETTER (OUTPATIENT)
Age: 35
End: 2025-08-16